# Patient Record
Sex: MALE | Race: WHITE | Employment: FULL TIME | URBAN - METROPOLITAN AREA
[De-identification: names, ages, dates, MRNs, and addresses within clinical notes are randomized per-mention and may not be internally consistent; named-entity substitution may affect disease eponyms.]

---

## 2018-01-08 ENCOUNTER — OFFICE VISIT (OUTPATIENT)
Dept: PSYCHIATRY | Facility: CLINIC | Age: 28
End: 2018-01-08
Payer: COMMERCIAL

## 2018-01-08 VITALS — BODY MASS INDEX: 23.76 KG/M2 | WEIGHT: 175.4 LBS | HEIGHT: 72 IN

## 2018-01-08 DIAGNOSIS — F12.91 HISTORY OF MARIJUANA USE: ICD-10-CM

## 2018-01-08 DIAGNOSIS — F34.1 DYSTHYMIA: Primary | ICD-10-CM

## 2018-01-08 DIAGNOSIS — F33.41 RECURRENT MAJOR DEPRESSIVE DISORDER, IN PARTIAL REMISSION (H): ICD-10-CM

## 2018-01-08 NOTE — MR AVS SNAPSHOT
After Visit Summary   1/8/2018    Tanner Acuña    MRN: [id number]           Patient Information     Date Of Birth          1990        Visit Information        Provider Department      1/8/2018 2:00 PM Lovely Mendes MD [facility] Psychiatry         Follow-ups after your visit        Your next 10 appointments already scheduled     Jan 22, 2018  9:00 AM CST   TMS EVAULATION with Lovely Mendes MD   [facility] Psychiatry ([facility])    [address]  [facility] [id number] [phone]              Who to contact     Please call your clinic at [phone] to:    Ask questions about your health    Make or cancel appointments    Discuss your medicines    Learn about your test results    Speak to your doctor   If you have compliments or concerns about an experience at your clinic, or if you wish to file a complaint, please contact [facility] Patient Relations at [phone] or email us at [email or web address]         Additional Information About Your Visit        MyChart Information     [employer] gives you secure access to your electronic health record. If you see a primary care provider, you can also send messages to your care team and make appointments. If you have questions, please call your primary care clinic.  If you do not have a primary care provider, please call [phone] and they will assist you.      [employer] is an electronic gateway that provides easy, online access to your medical records. With [employer], you can request a clinic appointment, read your test results, renew a prescription or communicate with your care team.     To access your existing account, please contact your [facility] or call [phone] for assistance.        Care EveryWhere ID     This is your Care EveryWhere ID. This could be used by other organizations to access your [place] medical records  [id number]        Your Vitals Were   "   Height BMI (Body Mass Index)                5' 11.5\" (181.6 cm) 24.12 kg/m2           Blood Pressure from Last 3 Encounters:   No data found for BP    Weight from Last 3 Encounters:   01/08/18 175 lb 6.4 oz (79.6 kg)              Today, you had the following     No orders found for display       Primary Care Provider Office Phone # Fax #    Durga Pinon [phone]       [address] [address]        Equal Access to Services     [facility] : Hadii aad ku hadasho Soomaali, waaxda luqadaha, qaybta kaalmada adeegyada, waxay idiin hayaan adesanford kharastefano hamlin . So [facility] [phone].    ATENCIÓN: Si habla español, tiene a chowdhury disposición servicios gratuitos de asistencia lingüística. [facility] [phone].    We comply with applicable federal civil rights laws and [place] laws. We do not discriminate on the basis of race, color, national origin, age, disability, sex, sexual orientation, or gender identity.            Thank you!     Thank you for choosing [facility]  for your care. Our goal is always to provide you with excellent care. Hearing back from our patients is one way we can continue to improve our services. Please take a few minutes to complete the written survey that you may receive in the mail after your visit with us. Thank you!             Your Updated Medication List - Protect others around you: Learn how to safely use, store and throw away your medicines at www.disposemymeds.org.          This list is accurate as of: 1/8/18  3:16 PM.  Always use your most recent med list.                   Brand Name Dispense Instructions for use Diagnosis    NALTREXONE HCL PO      Take 1.5 mg by mouth 2-3 tabs daily          "

## 2018-01-29 ENCOUNTER — OFFICE VISIT (OUTPATIENT)
Dept: PSYCHIATRY | Facility: CLINIC | Age: 28
End: 2018-01-29
Payer: COMMERCIAL

## 2018-01-29 VITALS
BODY MASS INDEX: 24.54 KG/M2 | HEART RATE: 57 BPM | HEIGHT: 70 IN | TEMPERATURE: 96.5 F | RESPIRATION RATE: 16 BRPM | WEIGHT: 171.4 LBS | SYSTOLIC BLOOD PRESSURE: 132 MMHG | DIASTOLIC BLOOD PRESSURE: 72 MMHG

## 2018-01-29 DIAGNOSIS — F33.41 RECURRENT MAJOR DEPRESSIVE DISORDER, IN PARTIAL REMISSION (H): ICD-10-CM

## 2018-01-29 DIAGNOSIS — F34.1 DYSTHYMIA: Primary | ICD-10-CM

## 2018-01-29 DIAGNOSIS — F12.91 HISTORY OF MARIJUANA USE: ICD-10-CM

## 2018-01-29 RX ORDER — ARIPIPRAZOLE 5 MG/1
5 TABLET ORAL AT BEDTIME
Qty: 15 TABLET | Refills: 1 | Status: SHIPPED | OUTPATIENT
Start: 2018-01-29 | End: 2018-06-25

## 2018-01-29 ASSESSMENT — PAIN SCALES - GENERAL: PAINLEVEL: NO PAIN (0)

## 2018-01-29 NOTE — PROGRESS NOTES
"  Psychiatry Clinic New Patient Medication Evaluation                                           Tanner Acuña is a 27 year old male    Therapist: {NONE/NA:564652::\"None\"}  PCP: Durga Pinon  Other Providers: {NONE/NA:122625::\"None\"}  Referred by *** for evaluation of {Psych Assess List:414399}.     History was provided by {PATIENT. FAMILY:377019} who was a {HIST:735354} historian.     Chief Complaint                                                                                                        \" *** \"     History of Present Illness                                                                                     4, 4      Pertinent Background:  {PB1:563050}.  Psych critical item history includes {CRIT:860676}.     Most recent history began ***.    Recent Symptoms:   {PSYROS3:199388}       Recent Substance Use:  {SUB:199476}      Substance Use History     {SUBHX:666092}     Psychiatric History     {PSYHX:400494}       Psychiatric Medication Trials     {PSYMEDS:383125}                                                       OR this and delete the other    Drug /  Start Date Dose (mg) Helpful Adverse Effects DC Reason / Date                           Social/ Family History               [per patient report]                                                      1ea, 1ea     {SOCIAL:199397}     Medical / Surgical History   There is no problem list on file for this patient.      No past surgical history on file.    Medical Review of Systems                                                                                                         2, 10     {MEDROS:199390}     Allergy   Review of patient's allergies indicates not on file.   Current Medications     Current Outpatient Prescriptions   Medication Sig Dispense Refill     NALTREXONE HCL PO Take 1.5 mg by mouth 2-3 tabs daily        Vitals                                                                                                                          " "   3, 3     There were no vitals taken for this visit.      Mental Status Exam                                                                                        9, 14 cog gs     Alertness: {ALERTNESS:332798}  Appearance: {APPEARANCE :279197}  Behavior/Demeanor: {BEHAVIOR :591178}, with {Desc; good/fair/poor:752097} eye contact   Speech: {SPEECH :466286}  Language: {LANGUAGE :648534}  Psychomotor: {PSYCHOMOTOR :595909}  Mood: {MOOD :808677}  Affect: {AFFECT :358757}; {was:202526} congruent to mood; {was:488403} congruent to content  Thought Process/Associations: {THOUGHT PROCESS:743507}  Thought Content:  Reports {THOUGHT CONTENT :678483};  Denies {THOUGHT CONTENT :209858}  Perception:  Reports {PERCEP:741812};  Denies {PERCEP:932192}  Insight: {INSIGHT :576412}  Judgment: {JUDGMENT :794103}  Cognition: (6) {co}  Gait and Station: {UNREMARKABLE refresh:030699}     Labs and Data     Rating Scales:  {scales:284123}    PHQ9 Today:  ***  No flowsheet data found.      No lab results found.  No lab results found.    Diagnosis and Assessment                                                                                  m2, h3     Today the following issues were addressed:    {PSYPROB:862402}    {PSYPMP:947639}    {PSYDRUGINT:581742}    Plan                                                                                                                          m2, h3     1) ***  {PSYTX:082686}    2) ***  {PSYTX:493016}    3) ***  {PSYTX:173896}    RTC: ***    CRISIS NUMBERS:   Provided routinely in AVS.    Treatment Risk Statement:  The patient understands the risks, benefits, adverse effects and alternatives. Agrees to treatment with the capacity to do so. No medical contraindications to treatment. Agrees to call clinic for any problems. The patient understands to call 911 or go to the nearest ED if life threatening or urgent symptoms occur.     WHODAS 2.0  TODAY total score = {NA:369896::\"N/A\"}; [a 12-item " WHODAS 2.0 assessment {was:607046} completed by the pt today and/or recorded in EPIC].     PROVIDER:  Lovely Mendes MD

## 2018-01-29 NOTE — PROGRESS NOTES
"  Psychiatry Clinic New Patient Medication Evaluation                                           Tanner Acuña is a 27 year old male    Therapist: None  PCP: Durga Pinon  Other Providers: None  Referred by self for evaluation of depression and attentional problems.     History was provided by patient who was a good historian.     Chief Complaint                                                                                                        \" Patient reports having difficulties with mood and anxiety for a number of years. \"I have not felt good for a long time but I am functional \"     History of Present Illness                                                                                     4, 4      Pertinent Background:  This patient first experienced depressed mood and obsessive 'heavy concious\" during his adolescents. He later experienced depressed mood and anxiety during college. 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 .  Psych critical item history includes mild depression and constant anxiety and ruminations and mutiple psychotropic trials. Patient is able to work and 'on the surface' does not give indications of distress but internally doesn't 'feel right'2    Most recent history began Back in October 2016, patient got off all meds. He has tried supplements, exercise, and other holistic approaches without major success. He relocated back to [place] and is seeking help. .    Recent Symptoms:   Depression:  anhedonia, low energy, poor concentration /memory and indecisiveness  Anxiety:  excessive worry and nervous/overwhelmed       Recent Substance Use:  none reported      Substance Use History     Past Use- He experimented with marijuana in college. But it was really that one event back at 24 when he had a dissociative paramoid experience with marijuana (presumably the second day " after having sex - countrary to his [employer] beliefs)     Psychiatric History     Suicidal Ideation Hx [passive, active]- during the paranoid experience. He saw a counselor at e time and later referred for some medications. He eventually stopped seeing healthcare providers because of financial reasons.        Psychiatric Medication Trials     Zoloft (sertraline), Wellbutrin, Zyban, Aplenzin (bupropion) and Cymbalta (duloxetine). Note that the original trial with ZOloft was helpful but improving his 'dissociative feelings' by 80%.  but then lost its effects. This is when he tried others .                                                         Social/ Family History               [per patient report]                                                      1ea, 1ea     FINANCIAL SUPPORT- working and but not currently insured for mental health care       EARLY HISTORY/ EDUCATION- Good education, completed college  SOCIAL/ SPIRITUAL SUPPORT- grew up in a Congregation environment, parents where strict christians, this has influenced his morality and in parts explains why he thought should not have sex before marrieage       CULTURAL INFLUENCES/ IMPACT- UNKNOWN       FAMILY HISTORY-  One of 5 siblings. Uncle and grandfather (paternal side) suffered from depression. Aunt (maternal) had BPAD, her daughter and granddaughter have schizophrenia     Medical / Surgical History     Patient Active Problem List   Diagnosis     Dysthymia     Recurrent major depressive disorder, in partial remission (H)     History of marijuana use       No past surgical history on file.    Medical Review of Systems                                                                                                         2, 10     PSYCH: See HPI  The remainder of the review of systems is noncontributory     Allergy   Review of patient's allergies indicates not on file.   Current Medications     Current Outpatient Prescriptions   Medication Sig Dispense  "Refill     NALTREXONE HCL PO Take 1.5 mg by mouth 2-3 tabs daily        Vitals                                                                                                                             3, 3     Ht 1.816 m (5' 11.5\")  Wt 79.6 kg (175 lb 6.4 oz)  BMI 24.12 kg/m2      Mental Status Exam                                                                                        9, 14 cog gs     Alertness: alert  and oriented  Appearance: well groomed  Behavior/Demeanor: cooperative and pleasant, with good  eye contact   Speech: normal  Language: no problems  Psychomotor: normal or unremarkable  Mood: depressed, anxious and worried  Affect: full range; was congruent to mood; was congruent to content  Thought Process/Associations: unremarkable  Thought Content:  Reports preoccupations;  Denies suicidal ideation  Perception:  Reports none;  Denies none  Insight: good  Judgment: good  Cognition: (6) oriented: time, person, and place  attention span: intact  concentration: intact  recent memory: intact  remote memory: intact  fund of knowledge: appropriate  Gait and Station: unremarkable     Labs and Data     Rating Scales:  PHQ9    PHQ9 Today:  13  No flowsheet data found.      No lab results found.  No lab results found.    Diagnosis and Assessment                                                                                  m2, h3     Today the following issues were addressed:    1) feeling dissociated \"not quite oneself'  2) mild depression  3) anxiety and ruminations    [place] Prescription Monitoring Program [] review was not needed today.        Plan                                                                                                                          m2, h3     1) have patient come back to complete additional assessments and finalize diagnosis  None today      RTC: *2weeks    CRISIS NUMBERS:   Provided routinely in AVS.    Treatment Risk Statement:  The patient understands the risks, " benefits, adverse effects and alternatives. Agrees to treatment with the capacity to do so. No medical contraindications to treatment. Agrees to call clinic for any problems. The patient understands to call 911 or go to the nearest ED if life threatening or urgent symptoms occur.     WHODAS 2.0  TODAY total score = N/A; [a 12-item WHODAS 2.0 assessment was not completed by the pt today and/or recorded in EPIC].     PROVIDER:  Lovely Mendes MD

## 2018-01-29 NOTE — MR AVS SNAPSHOT
After Visit Summary   1/29/2018    Tanner Acuña    MRN: [id number]           Patient Information     Date Of Birth          1990        Visit Information        Provider Department      1/29/2018 9:00 AM Lovely Mendes MD [facility] Psychiat[facility]        Today's Diagnoses     Dysthymia    -  1    Recurrent major depressive disorder, in partial remission (H)        History of marijuana use          Care Instructions    Call within a week to update if tolerated med. Can certainly call earlier if needed.          Follow-ups after your visit        Follow-up notes from your care team     Return in about 4 weeks (around 2/26/2018) for Med Follow up.      Your next 10 appointments already scheduled     Feb 26, 2018  9:00 AM CST   Return Visit with Lovely Mendes MD   [facility] ([facility])    [address] [address] [phone]              Who to contact     Please call your clinic at [phone] to:    Ask questions about your health    Make or cancel appointments    Discuss your medicines    Learn about your test results    Speak to your doctor            Additional Information About Your Visit        [employer] Information     [employer] gives you secure access to your electronic health record. If you see a primary care provider, you can also send messages to your care team and make appointments. If you have questions, please call your primary care clinic.  If you do not have a primary care provider, please call [phone] and they will assist you.      [employer] is an electronic gateway that provides easy, online access to your medical records. With [employer], you can request a clinic appointment, read your test results, renew a prescription or communicate with your care team.     To access your existing account, please contact your [facility] or call [phone] for assistance.        Care EveryWhere ID     This is your Care  "EveryWhere ID. This could be used by other organizations to access your [place] medical records  [id number]        Your Vitals Were     Pulse Temperature Respirations Height BMI (Body Mass Index)       57 96.5  F (35.8  C) 16 1.79 m (5' 10.47\") 24.27 kg/m2        Blood Pressure from Last 3 Encounters:   01/29/18 132/72    Weight from Last 3 Encounters:   01/29/18 77.7 kg (171 lb 6.4 oz)   01/08/18 79.6 kg (175 lb 6.4 oz)              Today, you had the following     No orders found for display         Today's Medication Changes          These changes are accurate as of 1/29/18 11:59 PM.  If you have any questions, ask your nurse or doctor.               Start taking these medicines.        Dose/Directions    ARIPiprazole 5 MG tablet   Commonly known as:  ABILIFY   Used for:  Dysthymia, Recurrent major depressive disorder, in partial remission (H)   Started by:  Lovely Mendes MD        Dose:  5 mg   Take 1 tablet (5 mg) by mouth At Bedtime Start with half tablet at bedtime for 5 days then increase to 1 full tablet of 5 mg at bedtime.   Quantity:  15 tablet   Refills:  1            Where to get your medicines      Some of these will need a paper prescription and others can be bought over the counter.  Ask your nurse if you have questions.     Bring a paper prescription for each of these medications     ARIPiprazole 5 MG tablet                Primary Care Provider Office Phone # Fax #    Durga Pinon [phone]       [place] [address]        Equal Access to Services     [facility] AH: Hadii damien rojaso Soargentina, waaxda luqadaha, qaybta kaalmada adeegyada, waxclaudia dominick wolf. So [facility] [phone].    ATENCIÓN: Si habla español, tiene a chowdhury disposición servicios gratuitos de asistencia lingüística. Llame al [phone].    We comply with applicable federal civil rights laws and [place] laws. We do not discriminate on the basis of race, " color, national origin, age, disability, sex, sexual orientation, or gender identity.            Thank you!     Thank you for choosing [facility]  for your care. Our goal is always to provide you with excellent care. Hearing back from our patients is one way we can continue to improve our services. Please take a few minutes to complete the written survey that you may receive in the mail after your visit with us. Thank you!             Your Updated Medication List - Protect others around you: Learn how to safely use, store and throw away your medicines at www.disposemymeds.org.          This list is accurate as of 1/29/18 11:59 PM.  Always use your most recent med list.                   Brand Name Dispense Instructions for use Diagnosis    ARIPiprazole 5 MG tablet    ABILIFY    15 tablet    Take 1 tablet (5 mg) by mouth At Bedtime Start with half tablet at bedtime for 5 days then increase to 1 full tablet of 5 mg at bedtime.    Dysthymia, Recurrent major depressive disorder, in partial remission (H)       NALTREXONE HCL PO      Take 1.5 mg by mouth 2-3 tabs daily

## 2018-01-30 ASSESSMENT — PATIENT HEALTH QUESTIONNAIRE - PHQ9: SUM OF ALL RESPONSES TO PHQ QUESTIONS 1-9: 13

## 2018-02-09 NOTE — PROGRESS NOTES
Psychiatry Clinic Progress Note                                                                   Tanner Acuña is a 27 year old male who prefers the name Tanner and pronoun anibal.  Therapist: None  PCP: Durga Pinon  Other Providers: None    Pertinent Background:  See previous notes.  Psych critical item history includes [no critical items].     Interim History                                                                                                 4, 4     The patient is a good historian.  Since the last visit , he had reviewed the diagnostic criteria for [facility] (as I suggested it). He did not believe that this diagnosis really fit him despite the fact that he has 'high morals', 'wants to do things right' and often ruminates about self and his putative role in any argument or distressing situation. .    Recent Symptoms:   Depression:  Mood is slightly improved since switching jobs (tasks)  Anxiety:  excessive worry     Recent Substance Use:  none reported        Social/ Family History                                  [per patient report]                             1ea,1ea   FINANCIAL SUPPORT- working         Medical / Surgical History                                                                                                                  Patient Active Problem List   Diagnosis     Dysthymia     Recurrent major depressive disorder, in partial remission (H)     History of marijuana use       Past Surgical History:   Procedure Laterality Date     HEAD & NECK SURGERY      lump removed from neck as a child      Medical Review of Systems                                                                                          2,10   PSYCH: See HPI  Allergy                                Mold  Current Medications                                                                                                       Current Outpatient Prescriptions   Medication Sig Dispense Refill     ARIPiprazole (ABILIFY) 5  "MG tablet Take 1 tablet (5 mg) by mouth At Bedtime Start with half tablet at bedtime for 5 days then increase to 1 full tablet of 5 mg at bedtime. 15 tablet 1     NALTREXONE HCL PO Take 1.5 mg by mouth 2-3 tabs daily       Vitals                                                                                                                3, 3   /72 (BP Location: Right arm, Patient Position: Chair, Cuff Size: Adult Regular)  Pulse 57  Temp 96.5  F (35.8  C)  Resp 16  Ht 1.79 m (5' 10.47\")  Wt 77.7 kg (171 lb 6.4 oz)  BMI 24.27 kg/m2   Mental Status Exam                                                                                        9, 14 cog gs     Alertness: alert   Appearance: well groomed  Behavior/Demeanor: cooperative, with good  eye contact   Speech: normal  Language: intact  Psychomotor: normal or unremarkable  Mood: depressed  Affect: full range; was congruent to mood; was congruent to content  Thought Process/Associations: unremarkable  Thought Content:  Reports preoccupations;  Denies suicidal ideation  Perception:  Reports derealization;  Denies none  Insight: good  Judgment: good  Cognition: (6) does  appear grossly intact; formal cognitive testing was not done  Gait/Station and/or Muscle Strength/Tone: unremarkable    Labs and Data                                                                                                                 Rating Scales:  PHQ9    PHQ9 Today:  13  PHQ-9 SCORE 1/29/2018   Total Score 13         Diagnosis and Assessment                                                                                  m2, h3     Today the following issues were addressed:    1) dysthymia  2) financial assistance to seek mental health care    [place] Prescription Monitoring Program [] review was not needed today.    PSYCHOTROPIC DRUG INTERACTIONS: none clinically relevant    Plan                                                                                                  "                        m2, h3      1) Start Abilify (evidence for atypical antipsychotics for dysthymia). Explained to patient evidence based for product not available in the [place]. Chose abilify as an alternative.; Consulted with pharmacist so that he would get the lowest cost medications.   Medication-    2) depression / anxiety  Therapy- currently doesn't have the financial resources to start. we will consider it if minimal response to meds          RTC: in 1 month but instructed to call back with questions and update on tolerance of medication    CRISIS NUMBERS:   Provided routinely in AVS.    Treatment Risk Statement:  The patient understands the risks, benefits, adverse effects and alternatives. Agrees to treatment with the capacity to do so. No medical contraindications to treatment. Agrees to call clinic for any problems. The patient understands to call 911 or go to the nearest ED if life threatening or urgent symptoms occur.      TREATMENT  PLAN          SYMPTOMS; PROBLEMS   MEASURABLE GOALS;    FUNCTIONAL IMPROVEMENT INTERVENTIONS;   GAINS MADE DISCHARGE CRITERIA   Depression: depressed mood, anhedonia and low energy  Anxiety: excessive worry and nervous/overwhelmed   reduce depressive symptoms acceptance of limitations/reality  building on strengths  communication skills symptom resolution   Substance Use: abstinence   dissociation / suspiciousness medications  symptom resolution     PROVIDER:  Lovely Mendes MD

## 2018-03-05 ENCOUNTER — OFFICE VISIT (OUTPATIENT)
Dept: PSYCHIATRY | Facility: CLINIC | Age: 28
End: 2018-03-05
Payer: COMMERCIAL

## 2018-03-05 VITALS
TEMPERATURE: 98.2 F | DIASTOLIC BLOOD PRESSURE: 66 MMHG | WEIGHT: 176.4 LBS | HEART RATE: 67 BPM | BODY MASS INDEX: 25.25 KG/M2 | HEIGHT: 70 IN | SYSTOLIC BLOOD PRESSURE: 124 MMHG

## 2018-03-05 DIAGNOSIS — F39 MOOD DISORDER (H): Primary | ICD-10-CM

## 2018-03-05 RX ORDER — LAMOTRIGINE 25 MG/1
25 TABLET ORAL DAILY
Qty: 15 TABLET | Refills: 1 | Status: SHIPPED | OUTPATIENT
Start: 2018-03-05 | End: 2018-06-25

## 2018-03-05 ASSESSMENT — PAIN SCALES - GENERAL: PAINLEVEL: NO PAIN (0)

## 2018-03-05 NOTE — PATIENT INSTRUCTIONS
Start with lamotrigine 25 mg per day for 2 weeks  Informed about risk of Marion Abhi. Stop meds if rash and contact provider  Once insurance is active, start psychotherapy with a focus on mindfulness  Call back on Thursday to set up an appointment

## 2018-03-05 NOTE — PROGRESS NOTES
Psychiatry Clinic Progress Note                                                                   Tanner Acuña is a 27 year old male who prefers the name Tanner  Therapist: None  PCP: Durga Pinon  Other Providers: None    Pertinent Background:  See previous notes.  Psych critical item history includes SUBSTANCE USE: cannabis. In addition to dysthymia and dissociations     Interim History                                                                                                        4, 4     The patient reports adequate treatment adherence for 1 week.  Since the last visit he tried abilify 2.5 mg x 3 days then 5 mg for 2 days. He experienced feeling like a robot, flat and unemotional. He disliked the experience and stopped it. The past week, having changed jobs, into more creative and interactive tasks, he feels 25 % better. He amicably broke off with his girlfriend last Sunday (Long distance relationship - she living in [place])    Recent Symptoms:   Depression:  mild depression, and chronic sense of mild dissociation when not engaged in focused mental activities like an important conversation     Recent Substance Use:  none reported        Social/ Family History                                  [per patient report]                                 1ea,1ea   seeking mental health insurance coverage    Medical / Surgical History                                                                                                                  Patient Active Problem List   Diagnosis     Dysthymia     Recurrent major depressive disorder, in partial remission (H)     History of marijuana use       Past Surgical History:   Procedure Laterality Date     HEAD & NECK SURGERY      lump removed from neck as a child        Medical Review of Systems                                                                                                    2,10   not performed outside of the [facility]  Allergy                              "   Mold  Current Medications                                                                                                       Current Outpatient Prescriptions   Medication Sig Dispense Refill     Fexofenadine HCl (ALLEGRA PO) Take by mouth daily       lamoTRIgine (LAMICTAL) 25 MG tablet Take 1 tablet (25 mg) by mouth daily for 15 days 15 tablet 1     ARIPiprazole (ABILIFY) 5 MG tablet Take 1 tablet (5 mg) by mouth At Bedtime Start with half tablet at bedtime for 5 days then increase to 1 full tablet of 5 mg at bedtime. 15 tablet 1     NALTREXONE HCL PO Take 1.5 mg by mouth 2-3 tabs daily       Vitals                                                                                                                       3, 3   /66 (BP Location: Right arm, Patient Position: Chair, Cuff Size: Adult Regular)  Pulse 67  Temp 98.2  F (36.8  C) (Temporal)  Ht 1.79 m (5' 10.47\")  Wt 80 kg (176 lb 6.4 oz)  BMI 24.97 kg/m2   Mental Status Exam                                                                                    9, 14 cog gs     Alertness: alert  and oriented  Appearance: well groomed  Behavior/Demeanor: cooperative, pleasant and calm, with good  eye contact   Speech: normal  Language: intact  Psychomotor: normal or unremarkable  Mood: depressed  Affect: restricted; was congruent to mood; was congruent to content  Thought Process/Associations: unremarkable  Thought Content:  Reports preoccupations;  Denies none  Perception:  Reports none;  Denies none  Insight: good  Judgment: good  Cognition: (6) does  appear grossly intact; formal cognitive testing was not done  Gait/Station and/or Muscle Strength/Tone: unremarkable    Labs and Data                                                                                                                 Rating Scales:    N/A    PHQ9 Today:  Not done  PHQ-9 SCORE 1/29/2018   Total Score 13         Diagnosis and Assessment                                        "                                      m2, h3     Today the following issues were addressed:    1) mood    [place] Prescription Monitoring [place] [] was checked today:  indicates subutex.    PSYCHOTROPIC DRUG INTERACTIONS: None.    Plan                                                                                                                    m2, h3      1) dysthymia   Medication-   Did not tolerate abilify, will switch to lamotrigine as only mood stabilizer with antidepressant actions    2) dissociation   Medication-\  Start lamotrigine at 25 mg (slow taper )  Once with insurance coverage, refer to psychotherapy with a focus on mindfulness        RTC: he will contact clinic next Thursday    CRISIS NUMBERS:   Provided routinely in AVS.    Treatment Risk Statement:  The patient understands the risks, benefits, adverse effects and alternatives. Agrees to treatment with the capacity to do so. No medical contraindications to treatment. Agrees to call clinic for any problems. The patient understands to call 911 or go to the nearest ED if life threatening or urgent symptoms occur.      Psychiatry Clinic Individual Psychotherapy Note                                                                     [16]     Start time - 9 am        End time - 9:30 am  Date reviewed - N/A       Date next due - N/A     Subjective: This supportive psychotherapy session addressed issues related to focused on starting mindfulness and working around his engagement in the moment.  Patient's reaction: Pre-contemplation in the context of mental status appropriate for ambulatory setting.  Progress: just started  Plan: RTC when insurance cleared  Psychotherapy services during this visit included myself and the patient.   Treatment Plan      SYMPTOMS; PROBLEMS   MEASURABLE GOALS;    FUNCTIONAL IMPROVEMENT INTERVENTIONS;   GAINS MADE DISCHARGE CRITERIA   Depression: depressed mood, anhedonia and concentration problems  Anxiety: excessive worry  and ruminations   Medications:  optimize medications   reduce depressive symptoms acceptance of limitations/reality marked symptom improvement             PROVIDER:  Lovely Mendes MD

## 2018-03-05 NOTE — MR AVS SNAPSHOT
"              After Visit Summary   3/5/2018    Tanner Acuña    MRN: [id number]           Patient Information     Date Of Birth          1990        Visit Information        Provider Department      3/5/2018 9:00 AM Lovely Mendes MD [facility] Psychiatry        Today's Diagnoses     Mood disorder (H)    -  1      Care Instructions    Start with lamotrigine 25 mg per day for 2 weeks  Informed about risk of Marion Abhi. Stop meds if rash and contact provider  Once insurance is active, start psychotherapy with a focus on mindfulness  Call back on Thursday to set up an appointment           Follow-ups after your visit        Who to contact     Please call your clinic at [phone] to:    Ask questions about your health    Make or cancel appointments    Discuss your medicines    Learn about your test results    Speak to your doctor            Additional Information About Your Visit        [employer] Information     [employer] gives you secure access to your electronic health record. If you see a primary care provider, you can also send messages to your care team and make appointments. If you have questions, please call your primary care clinic.  If you do not have a primary care provider, please call [phone] and they will assist you.      [employer] is an electronic gateway that provides easy, online access to your medical records. With [employer], you can request a clinic appointment, read your test results, renew a prescription or communicate with your care team.     To access your existing account, please contact your [facility] or call [phone] for assistance.        Care EveryWhere ID     This is your Care EveryWhere ID. This could be used by other organizations to access your [place] medical records  [id number]        Your Vitals Were     Pulse Temperature Height BMI (Body Mass Index)          67 98.2  F (36.8  C) (Temporal) 1.79 m (5' 10.47\") 24.97 kg/m2         Blood Pressure " from Last 3 Encounters:   03/05/18 124/66   01/29/18 132/72    Weight from Last 3 Encounters:   03/05/18 80 kg (176 lb 6.4 oz)   01/29/18 77.7 kg (171 lb 6.4 oz)   01/08/18 79.6 kg (175 lb 6.4 oz)              Today, you had the following     No orders found for display         Today's Medication Changes          These changes are accurate as of 3/5/18  9:55 AM.  If you have any questions, ask your nurse or doctor.               Start taking these medicines.        Dose/Directions    lamoTRIgine 25 MG tablet   Commonly known as:  LAMICTAL   Used for:  Mood disorder (H)   Started by:  Lovely Mendes MD        Dose:  25 mg   Take 1 tablet (25 mg) by mouth daily for 15 days   Quantity:  15 tablet   Refills:  1            Where to get your medicines      Some of these will need a paper prescription and others can be bought over the counter.  Ask your nurse if you have questions.     Bring a paper prescription for each of these medications     lamoTRIgine 25 MG tablet                Primary Care Provider Office Phone # Fax #    Durga Pinon [phone]       [place] [id number]        Equal Access to Services     BHASKAR GAVIRIA AH: Hadii damien geiger Soargentina, walesleeda luqcelia, qaybta kaalmada adeannmarieda, francis wolf. So [facility] [phone].    ATENCIÓN: Si habla español, tiene a chowdhury disposición servicios gratuitos de asistencia lingüística. Llame al [phone].    We comply with applicable federal civil rights laws and [place] laws. We do not discriminate on the basis of race, color, national origin, age, disability, sex, sexual orientation, or gender identity.            Thank you!     Thank you for choosing [facility]  for your care. Our goal is always to provide you with excellent care. Hearing back from our patients is one way we can continue to improve our services. Please take a few minutes to complete the written survey that you  may receive in the mail after your visit with us. Thank you!             Your Updated Medication List - Protect others around you: Learn how to safely use, store and throw away your medicines at www.disposemymeds.org.          This list is accurate as of 3/5/18  9:55 AM.  Always use your most recent med list.                   Brand Name Dispense Instructions for use Diagnosis    ALLEGRA PO      Take by mouth daily        ARIPiprazole 5 MG tablet    ABILIFY    15 tablet    Take 1 tablet (5 mg) by mouth At Bedtime Start with half tablet at bedtime for 5 days then increase to 1 full tablet of 5 mg at bedtime.    Dysthymia, Recurrent major depressive disorder, in partial remission (H)       lamoTRIgine 25 MG tablet    LAMICTAL    15 tablet    Take 1 tablet (25 mg) by mouth daily for 15 days    Mood disorder (H)       NALTREXONE HCL PO      Take 1.5 mg by mouth 2-3 tabs daily

## 2018-03-14 ENCOUNTER — TRANSFERRED RECORDS (OUTPATIENT)
Dept: HEALTH INFORMATION MANAGEMENT | Facility: CLINIC | Age: 28
End: 2018-03-14

## 2018-06-25 ENCOUNTER — OFFICE VISIT (OUTPATIENT)
Dept: PSYCHIATRY | Facility: CLINIC | Age: 28
End: 2018-06-25
Payer: COMMERCIAL

## 2018-06-25 VITALS
TEMPERATURE: 98.1 F | WEIGHT: 172 LBS | SYSTOLIC BLOOD PRESSURE: 109 MMHG | BODY MASS INDEX: 24.35 KG/M2 | DIASTOLIC BLOOD PRESSURE: 61 MMHG | HEART RATE: 58 BPM

## 2018-06-25 DIAGNOSIS — F90.9 ATTENTION DEFICIT HYPERACTIVITY DISORDER (ADHD), UNSPECIFIED ADHD TYPE: ICD-10-CM

## 2018-06-25 DIAGNOSIS — F39 MOOD DISORDER (H): ICD-10-CM

## 2018-06-25 DIAGNOSIS — F41.1 GAD (GENERALIZED ANXIETY DISORDER): Primary | ICD-10-CM

## 2018-06-25 RX ORDER — NALTREXONE HYDROCHLORIDE 50 MG/1
50 TABLET, FILM COATED ORAL DAILY
Qty: 30 TABLET | Refills: 1 | Status: SHIPPED | OUTPATIENT
Start: 2018-06-25

## 2018-06-25 RX ORDER — MULTIPLE VITAMINS W/ MINERALS TAB 9MG-400MCG
1 TAB ORAL DAILY
COMMUNITY

## 2018-06-25 RX ORDER — LAMOTRIGINE 25 MG/1
25 TABLET ORAL 2 TIMES DAILY
Qty: 60 TABLET | Refills: 0 | Status: SHIPPED | OUTPATIENT
Start: 2018-06-25 | End: 2018-07-30 | Stop reason: DRUGHIGH

## 2018-06-25 ASSESSMENT — PAIN SCALES - GENERAL: PAINLEVEL: NO PAIN (0)

## 2018-06-25 NOTE — MR AVS SNAPSHOT
After Visit Summary   6/25/2018    Tanner Acuña    MRN: [id number]           Patient Information     Date Of Birth          1990        Visit Information        Provider Department      6/25/2018 9:00 AM Lovely Mendes MD [facility] Psychiatry        Today's Diagnoses     ISRA (generalized anxiety disorder)    -  1    Mood disorder (H)        Attention deficit hyperactivity disorder (ADHD), unspecified ADHD type          Care Instructions    Please call to give update in 3 weeks  Seek mindfulness therapy for dissociative symptoms  Start lamictal and increase in 2 weeks. The goal is t ohave you on 100 mg per day for 1 month before consdering other alternative like provigil or ritalin for inattentiveness / attention deficit disorder          Follow-ups after your visit        Follow-up notes from your care team     Return in about 5 weeks (around 7/30/2018).      Your next 10 appointments already scheduled     Jul 30, 2018  9:00 AM CDT   Return Visit with Lovely Mendes MD   [facility]iat[facility] ([facility])    [address] [address] [phone]              Who to contact     Please call your clinic at [phone] to:    Ask questions about your health    Make or cancel appointments    Discuss your medicines    Learn about your test results    Speak to your doctor            Additional Information About Your Visit        [employer] Information     [employer] gives you secure access to your electronic health record. If you see a primary care provider, you can also send messages to your care team and make appointments. If you have questions, please call your primary care clinic.  If you do not have a primary care provider, please call [phone] and they will assist you.      [employer] is an electronic gateway that provides easy, online access to your medical records. With [employer], you can request a clinic appointment, read your test results, renew a prescription or  communicate with your care team.     To access your existing account, please contact your [facility] or call [phone] for assistance.        Care EveryWhere ID     This is your Care EveryWhere ID. This could be used by other organizations to access your [place] medical records  [id number]        Your Vitals Were     Pulse Temperature BMI (Body Mass Index)             58 98.1  F (36.7  C) 24.35 kg/m2          Blood Pressure from Last 3 Encounters:   06/25/18 109/61   03/05/18 124/66   01/29/18 132/72    Weight from Last 3 Encounters:   06/25/18 172 lb (78 kg)   03/05/18 176 lb 6.4 oz (80 kg)   01/29/18 171 lb 6.4 oz (77.7 kg)              Today, you had the following     No orders found for display         Today's Medication Changes          These changes are accurate as of 6/25/18 10:02 AM.  If you have any questions, ask your nurse or doctor.               These medicines have changed or have updated prescriptions.        Dose/Directions    lamoTRIgine 25 MG tablet   Commonly known as:  LaMICtal   This may have changed:  when to take this   Used for:  Mood disorder (H)   Changed by:  Lovely Mendes MD        Dose:  25 mg   Take 1 tablet (25 mg) by mouth 2 times daily   Quantity:  60 tablet   Refills:  0       naltrexone 50 MG tablet   Commonly known as:  DEPADE;REVIA   This may have changed:    - how much to take  - when to take this   Used for:  Mood disorder (H)   Changed by:  Lovely Mendes MD        Dose:  50 mg   Take 1 tablet (50 mg) by mouth daily 2-3 tabs daily   Quantity:  30 tablet   Refills:  1            Where to get your medicines      These medications were sent to [id number] - [place], [place] - [address]  [address] [address]     Phone:  [phone]     lamoTRIgine 25 MG tablet         These medications were sent to [facility] [address] [place], [place] - [address] AT [address] [place] [address] & [address]  [place] [address]    Hours:  24-hours Phone:  [phone]     naltrexone 50 MG tablet                Primary Care Provider Office Phone # Fax #    Durga Pinon [phone]       [facility] [address] [facility] [address]        Equal Access to Services     ARMANDO GAVIRIA : Hadii aad ku hadasho Soomaali, waaxda luqadaha, qaybta kaalmada adeegyada, waxay idiin hayaan adeeg kharash la'aan . So [facility] [phone].    ATENCIÓN: Si habla español, tiene a chowdhury disposición servicios gratuitos de asistencia lingüística. [facility] [phone].    We comply with applicable federal civil rights laws and [place] laws. We do not discriminate on the basis of race, color, national origin, age, disability, sex, sexual orientation, or gender identity.            Thank you!     Thank you for choosing [facility]  for your care. Our goal is always to provide you with excellent care. Hearing back from our patients is one way we can continue to improve our services. Please take a few minutes to complete the written survey that you may receive in the mail after your visit with us. Thank you!             Your Updated Medication List - Protect others around you: Learn how to safely use, store and throw away your medicines at www.disposemymeds.org.          This list is accurate as of 6/25/18 10:02 AM.  Always use your most recent med list.                   Brand Name Dispense Instructions for use Diagnosis    ALLEGRA PO      Take by mouth daily        lamoTRIgine 25 MG tablet    LaMICtal    60 tablet    Take 1 tablet (25 mg) by mouth 2 times daily    Mood disorder (H)       Multi-vitamin Tabs tablet      Take 1 tablet by mouth daily        naltrexone 50 MG tablet    DEPADE;REVIA    30 tablet    Take 1 tablet (50 mg) by mouth daily 2-3 tabs daily    Mood disorder (H)

## 2018-06-25 NOTE — PATIENT INSTRUCTIONS
Please call to give update in 3 weeks  Seek mindfulness therapy for dissociative symptoms  Start lamictal and increase in 2 weeks. The goal is t ohave you on 100 mg per day for 1 month before consdering other alternative like provigil or ritalin for inattentiveness / attention deficit disorder

## 2018-06-25 NOTE — PROGRESS NOTES
Psychiatry Clinic Progress Note                                                                   Tanner Acuña is a 28 year old male  Therapist: None  PCP: Durga Pinon  Other Providers: None    Pertinent Background:  See previous notes.  Psych critical item history includes [see section specific documentation].     Interim History                                                                                                        4, 4     The patient is a fair historian.  Since the last visit in March. Patient tried lamictal 25 mg for 2 weeks and felt no difference so stopped it. Has not been taking low dose naltrexone for joint pain because of the cost of seeing a provider. Now has new insurance. .    Recent Symptoms:   Continue complaining of mild dissociation where he either space out of thinks of a million things.      Recent Substance Use:  none reported        Social/ Family History                                  [per patient report]                                 1ea,1ea   Doing well at work and gotten a raise, continue to have feelings of disconnectedness despite hanging out with many.     Medical / Surgical History                                                                                                                  Patient Active Problem List   Diagnosis     Dysthymia     Recurrent major depressive disorder, in partial remission (H)     History of marijuana use       Past Surgical History:   Procedure Laterality Date     HEAD & NECK SURGERY      lump removed from neck as a child        Medical Review of Systems                                                                                                    2,10   A comprehensive review of systems was performed and is negative other than noted in the HPI.  Allergy                                Mold  Current Medications                                                                                                       Current  Outpatient Prescriptions   Medication Sig Dispense Refill     multivitamin, therapeutic with minerals (MULTI-VITAMIN) TABS tablet Take 1 tablet by mouth daily       ARIPiprazole (ABILIFY) 5 MG tablet Take 1 tablet (5 mg) by mouth At Bedtime Start with half tablet at bedtime for 5 days then increase to 1 full tablet of 5 mg at bedtime. (Patient not taking: Reported on 6/25/2018) 15 tablet 1     Fexofenadine HCl (ALLEGRA PO) Take by mouth daily       lamoTRIgine (LAMICTAL) 25 MG tablet Take 1 tablet (25 mg) by mouth daily for 15 days 15 tablet 1     NALTREXONE HCL PO Take 1.5 mg by mouth 2-3 tabs daily       Vitals                                                                                                                       3, 3   /61 (BP Location: Right arm, Patient Position: Chair, Cuff Size: Adult Regular)  Pulse 58  Temp 98.1  F (36.7  C)  Wt 78 kg (172 lb)  BMI 24.35 kg/m2   Mental Status Exam                                                                                    9, 14 cog gs     Alertness: alert  and oriented  Appearance: casually groomed  Behavior/Demeanor: cooperative, with good  eye contact   Speech: normal  Language: intact  Psychomotor: normal or unremarkable  Mood: anxious  Affect: full range; was congruent to mood; was congruent to content  Thought Process/Associations: unremarkable  Thought Content:  Reports reports constant mild dissociation;  Denies none  Perception:  Reports derealization;  Denies derealization  Insight: good  Judgment: good  Cognition: (6) does  appear grossly intact; formal cognitive testing was not done  Gait/Station and/or Muscle Strength/Tone: unremarkable    Labs and Data                                                                                                                 Rating Scales:    PHQ9    PHQ9 Today:  9  PHQ-9 SCORE 1/29/2018   Total Score 13         Diagnosis and Assessment                                                                              m2, h3     Today the following issues were addressed:    1) dissociation    [place] Prescription Monitoring Program [] was checked today:  not using controlled substances.    PSYCHOTROPIC DRUG INTERACTIONS: None.    Plan                                                                                                                    m2, h3      1) dissociation. Possible [employer] defense versus chronic anxiety versus attention deficit  Reintroduce lamictal at 25 mg for 2 weeks, then taper up to 100 mg. Patient to call with update in 3 weeks.  Patient to start mindfullness therapy. Will seek therapist and run name by me.  Will also restart low dose naltrexone        RTC:5 weeks    CRISIS NUMBERS:   Provided routinely in AVS.    Treatment Risk Statement:  The patient understands the risks, benefits, adverse effects and alternatives. Agrees to treatment with the capacity to do so. No medical contraindications to treatment. Agrees to call clinic for any problems. The patient understands to call 911 or go to the nearest ED if life threatening or urgent symptoms occur.      Treatment Plan      SYMPTOMS; PROBLEMS   MEASURABLE GOALS;    FUNCTIONAL IMPROVEMENT INTERVENTIONS;   GAINS MADE DISCHARGE CRITERIA   Anxiety: dissociative   make a plan to manage 2-3 anxiety-provoking situations acceptance of limitations/reality marked symptom improvement   ADHD: difficulty paying attention    reduce feeling overwhelmed/ improve decision making skills acceptance of limitations/reality marked symptom improvement     PROVIDER:  Lovely Mendes MD

## 2018-06-26 ASSESSMENT — PATIENT HEALTH QUESTIONNAIRE - PHQ9: SUM OF ALL RESPONSES TO PHQ QUESTIONS 1-9: 9

## 2018-07-24 ENCOUNTER — TELEPHONE (OUTPATIENT)
Dept: PSYCHIATRY | Facility: CLINIC | Age: 28
End: 2018-07-24

## 2018-07-24 NOTE — TELEPHONE ENCOUNTER
-Writer called and spoke with Tanner, he reported that when he went up to 100 mg he started feeling some great anxiety.  Patient reported that he is unsure if the medication caused it, but wanted to be safe and went back to 50 mg.  Encouraged him to try 75 mg.  Tanner reports he will try this.  Also encouraged him to call back if he has any issues with 75 mg.

## 2018-07-24 NOTE — TELEPHONE ENCOUNTER
"----- Message from Lovely Mendes MD sent at 7/23/2018  2:39 PM CDT -----  Regarding: RE: Med update  Thank you Abbi Hudson, could you please call him and encourage him to go up to 75 mg     Z  ----- Message -----     From: Tristan Chandra CMA     Sent: 7/23/2018  10:10 AM       To: Abbi De Santiago RN, Lovely Mendes MD  Subject: Med update                                       Hi,    You recently put him on lamotrigine with a titration schedule. Patient felt fine until he got to 100mg. Pt felt \"ill\" at that dose with some irritability and \"hard heart beats.\" He dropped his medication back down to 50mg. He wanted to give you this update. Pt is also scheduled to see you in clinic next week 7/30.    -Tristan    "

## 2018-07-30 ENCOUNTER — OFFICE VISIT (OUTPATIENT)
Dept: PSYCHIATRY | Facility: CLINIC | Age: 28
End: 2018-07-30
Payer: COMMERCIAL

## 2018-07-30 VITALS
TEMPERATURE: 98.3 F | BODY MASS INDEX: 24.08 KG/M2 | WEIGHT: 168.2 LBS | SYSTOLIC BLOOD PRESSURE: 111 MMHG | HEIGHT: 70 IN | HEART RATE: 67 BPM | DIASTOLIC BLOOD PRESSURE: 64 MMHG

## 2018-07-30 DIAGNOSIS — F51.01 PRIMARY INSOMNIA: ICD-10-CM

## 2018-07-30 DIAGNOSIS — F41.1 GAD (GENERALIZED ANXIETY DISORDER): Primary | ICD-10-CM

## 2018-07-30 RX ORDER — TRAZODONE HYDROCHLORIDE 50 MG/1
50 TABLET, FILM COATED ORAL
Qty: 90 TABLET | Refills: 3 | Status: SHIPPED | OUTPATIENT
Start: 2018-07-30 | End: 2020-08-17

## 2018-07-30 RX ORDER — LAMOTRIGINE 100 MG/1
100 TABLET ORAL DAILY
Qty: 60 TABLET | Refills: 1 | Status: SHIPPED | OUTPATIENT
Start: 2018-07-30 | End: 2018-10-17

## 2018-07-30 RX ORDER — SERTRALINE HYDROCHLORIDE 100 MG/1
50 TABLET, FILM COATED ORAL DAILY
Qty: 30 TABLET | Refills: 1 | Status: SHIPPED | OUTPATIENT
Start: 2018-07-30 | End: 2019-01-28

## 2018-07-30 ASSESSMENT — PAIN SCALES - GENERAL: PAINLEVEL: NO PAIN (0)

## 2018-07-30 NOTE — PROGRESS NOTES
"  Psychiatry Clinic Progress Note                                                                   Tanner Acuña is a 28 year old male   Therapist: None  PCP: Durga Pinon  Other Providers: None    Pertinent Background:  See previous notes.  Psych critical item history includes dissociation and anxiety.     Interim History                                                                                                        4, 4     The patient is a fair historian.  Since the last visit he attempted to go up to lamictal 100 mg buyt coincided with the time when he was interested in this older friend and 'over thought abouyt the potentioal for them to connect and date\". That made him too anxious and ruminative. The relationship didn't work out. Since it didn't work out, the anxiety went away. .    Recent Symptoms:   Anxiety:  excessive worry and social anxiety     Recent Substance Use:  N/A        Social/ Family History                                  [per patient report]                                 1ea,1ea   Trying to date but has not dated for 10 years. Still struggling with self-expectations.     Medical / Surgical History                                                                                                                  Patient Active Problem List   Diagnosis     Dysthymia     Recurrent major depressive disorder, in partial remission (H)     History of marijuana use       Past Surgical History:   Procedure Laterality Date     HEAD & NECK SURGERY      lump removed from neck as a child        Medical Review of Systems                                                                                                    2,10   Lost 8 lbs in 2 weeks. Shoulder pain, on natrexone The remainder of the review of systems is noncontributory  Allergy                                Mold  Current Medications                                                                                                   " "    Current Outpatient Prescriptions   Medication Sig Dispense Refill     Fexofenadine HCl (ALLEGRA PO) Take by mouth daily       lamoTRIgine (LAMICTAL) 25 MG tablet Take 1 tablet (25 mg) by mouth 2 times daily 60 tablet 0     multivitamin, therapeutic with minerals (MULTI-VITAMIN) TABS tablet Take 1 tablet by mouth daily       naltrexone (DEPADE;REVIA) 50 MG tablet Take 1 tablet (50 mg) by mouth daily 2-3 tabs daily 30 tablet 1     Vitals                                                                                                                       3, 3   /64 (BP Location: Right arm, Patient Position: Chair, Cuff Size: Adult Regular)  Pulse 67  Temp 98.3  F (36.8  C) (Temporal)  Ht 1.79 m (5' 10.47\")  Wt 76.3 kg (168 lb 3.2 oz)  BMI 23.81 kg/m2   Mental Status Exam                                                                                    9, 14 cog gs     Alertness: alert  and oriented  Appearance: casually groomed  Behavior/Demeanor: cooperative, pleasant and calm, with good  eye contact   Speech: normal  Language: intact  Psychomotor: normal or unremarkable  Mood: anxious  Affect: full range; was congruent to mood; was congruent to content  Thought Process/Associations: unremarkable  Thought Content:  Reports none;  Denies suicidal ideation with plan; with intent [details in Interim History]  Perception:  Reports derealization;  Denies auditory hallucinations and visual hallucinations  Insight: adequate  Judgment: good  Cognition: (6) oriented: time, person, and place  attention span: intact  concentration: intact  remote memory: intact  Gait/Station and/or Muscle Strength/Tone: unremarkable    Labs and Data                                                                                                                 Rating Scales:    PHQ9    PHQ9 Today:  8  PHQ-9 SCORE 1/29/2018 6/25/2018   Total Score 13 9         Diagnosis and Assessment                                                 "                             m2, h3     Today the following issues were addressed:    1) dissociation and anxiety  2) insomnia    [place] Prescription Monitoring [place] review was not needed today.    PSYCHOTROPIC DRUG INTERACTIONS: none clinically relevant    Plan                                                                                                                    m2, h3      1) dissociation  Medications-  Agrees to try and increase the lamictal again as I don't believe the excessive ruminaton about the girl was due to it.   Sertraline 50 mg after reaching lamictal 150 mg  Continue with Mindfulness ap. (seems to helpl). Hasn't seen a therapist.     2) insomnia  Medications-  Start trazadone 50 mg, may increasae to 100 mg    RTC: 2 months      CRISIS NUMBERS:   Provided routinely in AVS.    Treatment Risk Statement:  The patient understands the risks, benefits, adverse effects and alternatives. Agrees to treatment with the capacity to do so. No medical contraindications to treatment. Agrees to call clinic for any problems. The patient understands to call 911 or go to the nearest ED if life threatening or urgent symptoms occur.        Plan: RTC 2 months      PROVIDER:  Lovely Mendes MD

## 2018-07-30 NOTE — MR AVS SNAPSHOT
After Visit Summary   7/30/2018    Tanner Acuña    MRN: [id number]           Patient Information     Date Of Birth          1990        Visit Information        Provider Department      7/30/2018 9:00 AM Lovely Mendes MD [facility]        Today's Diagnoses     ISRA (generalized anxiety disorder)    -  1    Primary insomnia          Care Instructions    If tolerate lamictal at 100 mg , increase within 3 weeks to 150 mg. Maximum would be 200 mg.           Follow-ups after your visit        Follow-up notes from your care team     Return in about 2 months (around 9/30/2018).      Your next 10 appointments already scheduled     Oct 01, 2018  9:00 AM CDT   Return Visit with Lovely Mendes MD   [facility] ([facility])    [address] [address] [phone]              Who to contact     Please call your clinic at [phone] to:    Ask questions about your health    Make or cancel appointments    Discuss your medicines    Learn about your test results    Speak to your doctor            Additional Information About Your Visit        [employer] Information     [employer] gives you secure access to your electronic health record. If you see a primary care provider, you can also send messages to your care team and make appointments. If you have questions, please call your primary care clinic.  If you do not have a primary care provider, please call [phone] and they will assist you.      [employer] is an electronic gateway that provides easy, online access to your medical records. With [employer], you can request a clinic appointment, read your test results, renew a prescription or communicate with your care team.     To access your existing account, please contact your [facility] or call [phone] for assistance.        Care EveryWhere ID     This is your Care EveryWhere ID. This could be used by other organizations to access  "your [place] medical records  [id number]        Your Vitals Were     Pulse Temperature Height BMI (Body Mass Index)          67 98.3  F (36.8  C) (Temporal) 5' 10.47\" (179 cm) 23.81 kg/m2         Blood Pressure from Last 3 Encounters:   07/30/18 111/64   06/25/18 109/61   03/05/18 124/66    Weight from Last 3 Encounters:   07/30/18 168 lb 3.2 oz (76.3 kg)   06/25/18 172 lb (78 kg)   03/05/18 176 lb 6.4 oz (80 kg)              Today, you had the following     No orders found for display         Today's Medication Changes          These changes are accurate as of 7/30/18  9:47 AM.  If you have any questions, ask your nurse or doctor.               Start taking these medicines.        Dose/Directions    sertraline 100 MG tablet   Commonly known as:  ZOLOFT   Used for:  ISRA (generalized anxiety disorder)   Started by:  Lovely Mendes MD        Dose:  50 mg   Take 0.5 tablets (50 mg) by mouth daily   Quantity:  30 tablet   Refills:  1       traZODone 50 MG tablet   Commonly known as:  DESYREL   Used for:  Primary insomnia   Started by:  Lovely Mendes MD        Dose:  50 mg   Take 1 tablet (50 mg) by mouth nightly as needed for sleep   Quantity:  90 tablet   Refills:  3         These medicines have changed or have updated prescriptions.        Dose/Directions    lamoTRIgine 100 MG tablet   Commonly known as:  LAMICTAL   This may have changed:    - medication strength  - how much to take  - when to take this   Used for:  ISRA (generalized anxiety disorder)   Changed by:  Lovely Mendes MD        Dose:  100 mg   Take 1 tablet (100 mg) by mouth daily   Quantity:  60 tablet   Refills:  1            Where to get your medicines      These medications were sent to [id number] - [place], [place] - [address] [place] [address]     Phone:  [phone]     lamoTRIgine 100 MG tablet    sertraline 100 MG tablet    traZODone 50 MG tablet                Primary Care Provider Office " Phone # Fax #    Durga Pinon [phone]       [place] [address]        Equal Access to Services     ARMANDO GAVIRIA : Hadii aad ku hadchintanjohn Moeller, walesleeda luqadaha, qaybta kaalmada yaratyesha, francis owenin hayaaasiya livingstonsanford shirley yakelin wolf. So [facility] [phone].    ATENCIÓN: Si habla español, tiene a chowdhury disposición servicios gratuitos de asistencia lingüística. Llame al [phone].    We comply with applicable federal civil rights laws and [place] laws. We do not discriminate on the basis of race, color, national origin, age, disability, sex, sexual orientation, or gender identity.            Thank you!     Thank you for choosing [facility]  for your care. Our goal is always to provide you with excellent care. Hearing back from our patients is one way we can continue to improve our services. Please take a few minutes to complete the written survey that you may receive in the mail after your visit with us. Thank you!             Your Updated Medication List - Protect others around you: Learn how to safely use, store and throw away your medicines at www.disposemymeds.org.          This list is accurate as of 7/30/18  9:47 AM.  Always use your most recent med list.                   Brand Name Dispense Instructions for use Diagnosis    ALLEGRA PO      Take by mouth daily        lamoTRIgine 100 MG tablet    LAMICTAL    60 tablet    Take 1 tablet (100 mg) by mouth daily    ISRA (generalized anxiety disorder)       Multi-vitamin Tabs tablet      Take 1 tablet by mouth daily        naltrexone 50 MG tablet    DEPADE;REVIA    30 tablet    Take 1 tablet (50 mg) by mouth daily 2-3 tabs daily    Mood disorder (H)       sertraline 100 MG tablet    ZOLOFT    30 tablet    Take 0.5 tablets (50 mg) by mouth daily    ISRA (generalized anxiety disorder)       traZODone 50 MG tablet    DESYREL    90 tablet    Take 1 tablet (50 mg) by mouth nightly as needed for sleep    Primary  insomnia

## 2018-08-04 ASSESSMENT — PATIENT HEALTH QUESTIONNAIRE - PHQ9: SUM OF ALL RESPONSES TO PHQ QUESTIONS 1-9: 12

## 2018-10-17 DIAGNOSIS — F41.1 GAD (GENERALIZED ANXIETY DISORDER): ICD-10-CM

## 2018-10-17 NOTE — TELEPHONE ENCOUNTER
Last seen: 7/30/18  RTC: 2 months  Cancel: 10/1/18  No-show: none  Next appt: 10/22/18    Incoming refill from [facility]  via fax    Medication requested: Lamotrigine 100 mg   Directions: Take 2 tablets by mouth daily   Qty: 60  Last refilled: 7/30/18 with one refill     Medication refill approved per refill protocol

## 2018-10-18 RX ORDER — LAMOTRIGINE 100 MG/1
200 TABLET ORAL DAILY
Qty: 60 TABLET | Refills: 0 | Status: SHIPPED | OUTPATIENT
Start: 2018-10-18 | End: 2018-11-21

## 2018-10-22 ENCOUNTER — OFFICE VISIT (OUTPATIENT)
Dept: PSYCHIATRY | Facility: CLINIC | Age: 28
End: 2018-10-22
Payer: COMMERCIAL

## 2018-10-22 VITALS
RESPIRATION RATE: 16 BRPM | DIASTOLIC BLOOD PRESSURE: 76 MMHG | HEART RATE: 80 BPM | TEMPERATURE: 98.3 F | WEIGHT: 166.6 LBS | SYSTOLIC BLOOD PRESSURE: 137 MMHG | BODY MASS INDEX: 23.59 KG/M2

## 2018-10-22 DIAGNOSIS — F90.9 ATTENTION DEFICIT HYPERACTIVITY DISORDER (ADHD), UNSPECIFIED ADHD TYPE: Primary | ICD-10-CM

## 2018-10-22 RX ORDER — MODAFINIL 100 MG/1
100 TABLET ORAL DAILY
Qty: 30 TABLET | Refills: 0 | Status: SHIPPED | OUTPATIENT
Start: 2018-10-22 | End: 2018-11-21

## 2018-10-22 ASSESSMENT — PAIN SCALES - GENERAL: PAINLEVEL: NO PAIN (0)

## 2018-10-22 NOTE — MR AVS SNAPSHOT
After Visit Summary   10/22/2018    Tanner Acuña    MRN: [id number]           Patient Information     Date Of Birth          1990        Visit Information        Provider Department      10/22/2018 10:00 AM Lovely Mendes MD [facility]        Today's Diagnoses     Attention deficit hyperactivity disorder (ADHD), unspecified ADHD type    -  1      Care Instructions    Call if need to be seen earlier  Call in couple of weeks to update on symptoms with new medication          Follow-ups after your visit        Follow-up notes from your care team     Return in about 3 months (around 1/22/2019).      Your next 10 appointments already scheduled     Jan 28, 2019  9:00 AM CST   Return Visit with Lovely Mendes MD   [facility] ([facility])    [address] [id number] [phone]              Who to contact     Please call your clinic at [phone] to:    Ask questions about your health    Make or cancel appointments    Discuss your medicines    Learn about your test results    Speak to your doctor            Additional Information About Your Visit        [employer] Information     [employer] gives you secure access to your electronic health record. If you see a primary care provider, you can also send messages to your care team and make appointments. If you have questions, please call your primary care clinic.  If you do not have a primary care provider, please call [phone] and they will assist you.      [employer] is an electronic gateway that provides easy, online access to your medical records. With [employer], you can request a clinic appointment, read your test results, renew a prescription or communicate with your care team.     To access your existing account, please contact your [facility] or call [phone] for assistance.        Care EveryWhere ID     This is your Care EveryWhere ID. This could be used by other  organizations to access your [place] medical records  [id number]        Your Vitals Were     Pulse Temperature Respirations BMI (Body Mass Index)          80 98.3  F (36.8  C) 16 23.59 kg/m2         Blood Pressure from Last 3 Encounters:   10/22/18 137/76   07/30/18 111/64   06/25/18 109/61    Weight from Last 3 Encounters:   10/22/18 166 lb 9.6 oz (75.6 kg)   07/30/18 168 lb 3.2 oz (76.3 kg)   06/25/18 172 lb (78 kg)              Today, you had the following     No orders found for display         Today's Medication Changes          These changes are accurate as of 10/22/18  4:21 PM.  If you have any questions, ask your nurse or doctor.               Start taking these medicines.        Dose/Directions    modafinil 100 MG tablet   Commonly known as:  PROVIGIL   Used for:  Attention deficit hyperactivity disorder (ADHD), unspecified ADHD type   Started by:  Lovely Mendes MD        Dose:  100 mg   Take 1 tablet (100 mg) by mouth daily   Quantity:  30 tablet   Refills:  0            Where to get your medicines      Some of these will need a paper prescription and others can be bought over the counter.  Ask your nurse if you have questions.     Bring a paper prescription for each of these medications     modafinil 100 MG tablet                Primary Care Provider Office Phone # Fax #    Durga Pinon [phone]       [place] [id number]        Equal Access to Services     [facility] Hospital[facility]ILLIAN AH: Hadii damien rojaso Soargentina, waaxda luqadaha, qaybta kaalmada dorys, waxclaudia dominick wolf. So [facility] [phone].    ATENCIÓN: Si habla español, tiene a chowdhury disposición servicios gratuitos de asistencia lingüística. Llame al [phone].    We comply with applicable federal civil rights laws and [place] laws. We do not discriminate on the basis of race, color, national origin, age, disability, sex, sexual orientation, or gender identity.             Thank you!     Thank you for choosing [facility]  for your care. Our goal is always to provide you with excellent care. Hearing back from our patients is one way we can continue to improve our services. Please take a few minutes to complete the written survey that you may receive in the mail after your visit with us. Thank you!             Your Updated Medication List - Protect others around you: Learn how to safely use, store and throw away your medicines at www.disposemymeds.org.          This list is accurate as of 10/22/18  4:21 PM.  Always use your most recent med list.                   Brand Name Dispense Instructions for use Diagnosis    ALLEGRA PO      Take by mouth daily        lamoTRIgine 100 MG tablet    LAMICTAL    60 tablet    Take 2 tablets (200 mg) by mouth daily    ISRA (generalized anxiety disorder)       modafinil 100 MG tablet    PROVIGIL    30 tablet    Take 1 tablet (100 mg) by mouth daily    Attention deficit hyperactivity disorder (ADHD), unspecified ADHD type       Multi-vitamin Tabs tablet      Take 1 tablet by mouth daily        naltrexone 50 MG tablet    DEPADE;REVIA    30 tablet    Take 1 tablet (50 mg) by mouth daily 2-3 tabs daily    Mood disorder (H)       sertraline 100 MG tablet    ZOLOFT    30 tablet    Take 0.5 tablets (50 mg) by mouth daily    ISRA (generalized anxiety disorder)       traZODone 50 MG tablet    DESYREL    90 tablet    Take 1 tablet (50 mg) by mouth nightly as needed for sleep    Primary insomnia

## 2018-10-22 NOTE — PATIENT INSTRUCTIONS
Call if need to be seen earlier  Call in couple of weeks to update on symptoms with new medication

## 2018-10-22 NOTE — PROGRESS NOTES
"  Psychiatry Clinic Progress Note                                                                   Tanner Acuña is a 28 year old male w  PCP: Durga Pinon  Other Providers: None    Pertinent Background:  See previous notes.  Psych critical item history includes depression, anxiety and dissociations.     Interim History                                                                                                        4, 4     The patient is a good historian.  Since the last visit he reports that his depression and anxiety are drastically improved. His mood is more stable with lamotrigine. However his 'dissociation\", being here but not being here is constant unless engaged in a major / important discussion on one to one. His sleep is improved and has not been using trazadone (too sedating in the morning).    Recent Symptoms:   Depression:  depressed mood, poor concentration /memory and dissociation. Unclear if his memory and word finding problems are related to psychiatric symptoms versus lamotrigine since they existed before starting medication / yet symptoms overall are much improved.   Anxiety:  much improved  Trauma Related:  none     Recent Substance Use:  none reported        Social/ Family History                                  [per patient report]                                 1ea,1ea   Continues to be engaged with work.   Is not dating but is socializing more.     Medical / Surgical History                                                                                                                  Patient Active Problem List   Diagnosis     Dysthymia     Recurrent major depressive disorder, in partial remission (H)     History of marijuana use       Past Surgical History:   Procedure Laterality Date     HEAD & NECK SURGERY      lump removed from neck as a child        Medical Review of Systems                                                                                                    " 2,10   The remainder of the review of systems is noncontributory  Allergy                                Mold  Current Medications                                                                                                       Current Outpatient Prescriptions   Medication Sig Dispense Refill     Fexofenadine HCl (ALLEGRA PO) Take by mouth daily       lamoTRIgine (LAMICTAL) 100 MG tablet Take 2 tablets (200 mg) by mouth daily 60 tablet 0     multivitamin, therapeutic with minerals (MULTI-VITAMIN) TABS tablet Take 1 tablet by mouth daily       naltrexone (DEPADE;REVIA) 50 MG tablet Take 1 tablet (50 mg) by mouth daily 2-3 tabs daily 30 tablet 1     sertraline (ZOLOFT) 100 MG tablet Take 0.5 tablets (50 mg) by mouth daily 30 tablet 1     traZODone (DESYREL) 50 MG tablet Take 1 tablet (50 mg) by mouth nightly as needed for sleep 90 tablet 3     Vitals                                                                                                                       3, 3   /76 (BP Location: Right arm, Patient Position: Chair, Cuff Size: Adult Regular)  Pulse 80  Temp 98.3  F (36.8  C)  Resp 16  Wt 75.6 kg (166 lb 9.6 oz)  BMI 23.59 kg/m2   Mental Status Exam                                                                                    9, 14 cog gs     Alertness: alert   Appearance: well groomed  Behavior/Demeanor: cooperative, with good  eye contact   Speech: normal  Language: no problems  Psychomotor: normal or unremarkable  Mood: description consistent with euthymia  Affect: full range; was congruent to mood; was congruent to content  Thought Process/Associations: unremarkable  Thought Content:  Reports none;  Denies suicidal ideation and violent ideation  Perception:  Reports none;  Denies auditory hallucinations and visual hallucinations  Insight: good  Judgment: good  Cognition: (6) oriented: time, person, and place  attention span: intact  concentration: intact  recent memory: intact  fund  of knowledge: appropriate  Gait/Station and/or Muscle Strength/Tone: unremarkable    Labs and Data                                                                                                                 Rating Scales:    N/A    PHQ9 Today:  With nurse  PHQ-9 SCORE 1/29/2018 6/25/2018 8/3/2018   Total Score 13 9 12         Diagnosis and Assessment                                                                             m2, h3     Today the following issues were addressed:    1) dissociation remains an issue    [place] Pres[place] Monitoring [place] [] review was not needed today.    PSYCHOTROPIC DRUG INTERACTIONS: none clinically relevant    Plan                                                                                                                    m2, h3      1) Start modafanil 100 mg to see if dissociation might improve. May substitute wioth ritalin 5 mg /day if insurancedoesn't cover it  Not much better with lamotrigine now for the past 6 weeks.  Encourage exercise and meditation    RTC: 3 months but call if needed and give update on response to modafanil    CRISIS NUMBERS:   Provided routinely in AVS.    Treatment Risk Statement:  The patient understands the risks, benefits, adverse effects and alternatives. Agrees to treatment with the capacity to do so. No medical contraindications to treatment. Agrees to call clinic for any problems. The patient understands to call 911 or go to the nearest ED if life threatening or urgent symptoms occur.        PROVIDER:  Lovely Mendes MD

## 2018-10-24 ASSESSMENT — PATIENT HEALTH QUESTIONNAIRE - PHQ9: SUM OF ALL RESPONSES TO PHQ QUESTIONS 1-9: 13

## 2018-11-21 ENCOUNTER — TELEPHONE (OUTPATIENT)
Dept: PSYCHIATRY | Facility: CLINIC | Age: 28
End: 2018-11-21

## 2018-11-21 DIAGNOSIS — F90.9 ATTENTION DEFICIT HYPERACTIVITY DISORDER (ADHD), UNSPECIFIED ADHD TYPE: ICD-10-CM

## 2018-11-21 DIAGNOSIS — F41.1 GAD (GENERALIZED ANXIETY DISORDER): ICD-10-CM

## 2018-11-21 RX ORDER — LAMOTRIGINE 100 MG/1
200 TABLET ORAL DAILY
Qty: 180 TABLET | Refills: 0 | Status: SHIPPED | OUTPATIENT
Start: 2018-11-21 | End: 2019-02-28

## 2018-11-21 RX ORDER — MODAFINIL 100 MG/1
100 TABLET ORAL DAILY
Qty: 30 TABLET | Refills: 0 | Status: SHIPPED | OUTPATIENT
Start: 2018-11-21

## 2018-11-21 RX ORDER — MODAFINIL 100 MG/1
100 TABLET ORAL DAILY
Qty: 30 TABLET | Refills: 0 | Status: SHIPPED | OUTPATIENT
Start: 2018-11-21 | End: 2018-11-21

## 2018-11-21 NOTE — ADDENDUM NOTE
Addended by: MARI DIALLO on: 11/21/2018 04:07 PM     Modules accepted: Orders     Intake triaged with Dr. Urrutia who recommended IP for SI and [facility] at [facility]

## 2018-11-21 NOTE — TELEPHONE ENCOUNTER
Nilam Chaparro MA Swanson, Melanie A, RN                   RE: lamoTRIgine (LAMICTAL) 100 MG tablet     SIG: Take 2 tablets (200 mg) by mouth daily - Oral     # of days supply: 90     Pharmacy:[id number] - [address] date: 01/28/2019     Request from: Patient calling    Notes: Please refill by tomorrow per pt. Please call when completed [phone] per pt.

## 2018-11-21 NOTE — TELEPHONE ENCOUNTER
-Writer also checked in with patient on modafinil.  Per patient he forgot to call but pharmacy never got script.  Writer will reprint script and send to pharmacy.

## 2019-01-28 ENCOUNTER — OFFICE VISIT (OUTPATIENT)
Dept: PSYCHIATRY | Facility: CLINIC | Age: 29
End: 2019-01-28
Payer: COMMERCIAL

## 2019-01-28 VITALS — RESPIRATION RATE: 16 BRPM | WEIGHT: 171.2 LBS | BODY MASS INDEX: 24.24 KG/M2

## 2019-01-28 DIAGNOSIS — F34.1 DYSTHYMIA: ICD-10-CM

## 2019-01-28 DIAGNOSIS — F90.9 ATTENTION DEFICIT HYPERACTIVITY DISORDER (ADHD), UNSPECIFIED ADHD TYPE: Primary | ICD-10-CM

## 2019-01-28 DIAGNOSIS — F41.1 GAD (GENERALIZED ANXIETY DISORDER): ICD-10-CM

## 2019-01-28 RX ORDER — SERTRALINE HYDROCHLORIDE 100 MG/1
50 TABLET, FILM COATED ORAL DAILY
Qty: 30 TABLET | Refills: 1 | Status: SHIPPED | OUTPATIENT
Start: 2019-01-28 | End: 2019-06-24

## 2019-01-28 ASSESSMENT — PAIN SCALES - GENERAL: PAINLEVEL: NO PAIN (0)

## 2019-01-28 ASSESSMENT — PATIENT HEALTH QUESTIONNAIRE - PHQ9: SUM OF ALL RESPONSES TO PHQ QUESTIONS 1-9: 8

## 2019-01-28 NOTE — PROGRESS NOTES
Psychiatry Clinic Progress Note                                                                   Tanner Acuña is a 28 year old male who prefers   Therapist: None  PCP: Durga Pinon  Other Providers: None    Pertinent Background:  See previous notes.  Psych critical item history includes mutiple psychotropic trials.     Interim History                                                                                                        4, 4     The patient is a good historian.  Since the last visit has been on meds (lamotrigine and modafanil). Reports more stable mood on lamotrigine. Has not tolerated more than 25 mg of modafanil which helps with his attention but has not helped with the 'memory and recall' nor with the sense of dissociation.    Recent Symptoms:   Depression:  depressed mood   Hoplessness around the issue of dissociation but not depressed. Anxious at time and someone obsessional.      Recent Substance Use:  none reported        Social/ Family History                                  [per patient report]                                 1ea,1ea   Love the work but not the workplace.   Office politics that 'is not my thing, it is weird'    Medical / Surgical History                                                                                                                  Patient Active Problem List   Diagnosis     Dysthymia     Recurrent major depressive disorder, in partial remission (H)     History of marijuana use       Past Surgical History:   Procedure Laterality Date     HEAD & NECK SURGERY      lump removed from neck as a child        Medical Review of Systems                                                                                                    2,10   The remainder of the review of systems is noncontributory  Allergy                                Mold  Current Medications                                                                                                        Current Outpatient Medications   Medication Sig Dispense Refill     Fexofenadine HCl (ALLEGRA PO) Take by mouth daily       lamoTRIgine (LAMICTAL) 100 MG tablet Take 2 tablets (200 mg) by mouth daily 180 tablet 0     modafinil (PROVIGIL) 100 MG tablet Take 1 tablet (100 mg) by mouth daily 30 tablet 0     multivitamin, therapeutic with minerals (MULTI-VITAMIN) TABS tablet Take 1 tablet by mouth daily       sertraline (ZOLOFT) 100 MG tablet Take 0.5 tablets (50 mg) by mouth daily 30 tablet 1     naltrexone (DEPADE;REVIA) 50 MG tablet Take 1 tablet (50 mg) by mouth daily 2-3 tabs daily (Patient not taking: Reported on 1/28/2019) 30 tablet 1     traZODone (DESYREL) 50 MG tablet Take 1 tablet (50 mg) by mouth nightly as needed for sleep (Patient not taking: Reported on 1/28/2019) 90 tablet 3     Vitals                                                                                                                       3, 3   Resp 16   Wt 171 lb 3.2 oz (77.7 kg)   BMI 24.24 kg/m     Mental Status Exam                                                                                    9, 14 cog gs     Alertness: alert   Appearance: casually groomed  Behavior/Demeanor: cooperative, pleasant and calm, with good  eye contact   Speech: normal  Language: intact  Psychomotor: normal or unremarkable  Mood: description consistent with euthymia  Affect: full range; was congruent to mood; was congruent to content  Thought Process/Associations: unremarkable  Thought Content:  Reports none;  Denies suicidal ideation and violent ideation  Perception:  Reports depersonalization and derealization;  Denies auditory hallucinations and visual hallucinations  Insight: good  Judgment: good  Cognition: (6) oriented: time, person, and place  attention span: intact  concentration: intact  recent memory: intact  fund of knowledge: appropriate  Gait/Station and/or Muscle Strength/Tone: unremarkable    Labs and Data                                                                                                                  Rating Scales:    PHQ9    PHQ9 Today:  8  PHQ-9 SCORE 8/3/2018 10/22/2018 1/28/2019   PHQ-9 Total Score 12 13 8         Diagnosis and Assessment                                                                             m2, h3     Today the following issues were addressed:    1) mood stable  2) anxiety and dissociation    [place] review was not needed today.    PSYCHOTROPIC DRUG INTERACTIONS: none clinically relevant    Plan                                                                                                                    m2, h3      1) Willing to try sertraline 50 mg per day since it had helped him in the past.   Medications-   Seeking therapy  Has contacts that he will follow    RTC: 3 months    CRISIS NUMBERS:   Provided routinely in AVS.    Treatment Risk Statement:  The patient understands the risks, benefits, adverse effects and alternatives. Agrees to treatment with the capacity to do so. No medical contraindications to treatment. Agrees to call clinic for any problems. The patient understands to call 911 or go to the nearest ED if life threatening or urgent symptoms occur.    Joy Mendes MD

## 2019-02-28 DIAGNOSIS — F41.1 GAD (GENERALIZED ANXIETY DISORDER): ICD-10-CM

## 2019-03-03 RX ORDER — LAMOTRIGINE 100 MG/1
200 TABLET ORAL DAILY
Qty: 60 TABLET | Refills: 2 | Status: SHIPPED | OUTPATIENT
Start: 2019-03-03 | End: 2019-06-24

## 2019-03-03 NOTE — TELEPHONE ENCOUNTER
Medication requested: Lamictal 100 MG tab  Last refilled: 11/21/18  Qty: 180      Last seen: 1/28/19  RTC: 3 months  Cancel: 0  No-show: 0  Next appt: 4/29/19    Refill decision:   Refilled for 30 days per protocol.

## 2019-06-24 DIAGNOSIS — F41.1 GAD (GENERALIZED ANXIETY DISORDER): ICD-10-CM

## 2019-06-27 RX ORDER — SERTRALINE HYDROCHLORIDE 100 MG/1
50 TABLET, FILM COATED ORAL DAILY
Qty: 30 TABLET | Refills: 0 | Status: SHIPPED | OUTPATIENT
Start: 2019-06-27 | End: 2019-09-09

## 2019-06-27 RX ORDER — LAMOTRIGINE 100 MG/1
200 TABLET ORAL DAILY
Qty: 60 TABLET | Refills: 0 | Status: SHIPPED | OUTPATIENT
Start: 2019-06-27 | End: 2019-07-24

## 2019-06-27 NOTE — TELEPHONE ENCOUNTER
Last seen: 1/28/19  RTC: 3 months   Cancel: 4/29/19  No-show: none  Next appt: none    Incoming refill from Pharmacy via fax    Medication requested: Sertraline 100 mg   Directions: Take 50 mg by mouth daily   Qty: 30  Last refilled: 1/28/19 with one refill    Medication requested: Lamotrigine 100 mg   Directions: Take 2 tablets by mouth daily   Qty: 60  Last refilled: 3/3/19 with two refills       Medication refill approved per refill protocol- message also sent to scheduling to reach out for an appointment

## 2019-07-01 DIAGNOSIS — F41.1 GAD (GENERALIZED ANXIETY DISORDER): ICD-10-CM

## 2019-07-02 RX ORDER — SERTRALINE HYDROCHLORIDE 100 MG/1
50 TABLET, FILM COATED ORAL DAILY
Qty: 30 TABLET | Refills: 0 | OUTPATIENT
Start: 2019-07-02

## 2019-07-02 RX ORDER — LAMOTRIGINE 100 MG/1
200 TABLET ORAL DAILY
Qty: 60 TABLET | Refills: 0 | OUTPATIENT
Start: 2019-07-02

## 2019-07-24 DIAGNOSIS — F41.1 GAD (GENERALIZED ANXIETY DISORDER): ICD-10-CM

## 2019-07-24 RX ORDER — LAMOTRIGINE 100 MG/1
200 TABLET ORAL DAILY
Qty: 60 TABLET | Refills: 0 | Status: SHIPPED | OUTPATIENT
Start: 2019-07-24 | End: 2019-08-12

## 2019-08-12 DIAGNOSIS — F41.1 GAD (GENERALIZED ANXIETY DISORDER): ICD-10-CM

## 2019-08-12 RX ORDER — LAMOTRIGINE 100 MG/1
200 TABLET ORAL DAILY
Qty: 60 TABLET | Refills: 0 | Status: SHIPPED | OUTPATIENT
Start: 2019-08-12 | End: 2019-09-03

## 2019-08-12 NOTE — TELEPHONE ENCOUNTER
Last seen: 1/28/19  RTC: 3 months  Cancel: 4/29/19  No-show: none  Next appt: 9/9/19    Incoming refill from Pharmacy via fax    Medication requested: Lamotrigine 100 mg   Directions: Take 2 tablets by mouth daily   Qty: 60  Last refilled: 7/24/19    Medication refill approved per refill protocol

## 2019-08-27 DIAGNOSIS — F41.1 GAD (GENERALIZED ANXIETY DISORDER): ICD-10-CM

## 2019-08-30 RX ORDER — LAMOTRIGINE 100 MG/1
TABLET ORAL
Qty: 60 TABLET | Refills: 0 | OUTPATIENT
Start: 2019-08-30

## 2019-09-03 RX ORDER — LAMOTRIGINE 100 MG/1
200 TABLET ORAL DAILY
Qty: 60 TABLET | Refills: 0 | Status: SHIPPED | OUTPATIENT
Start: 2019-09-03 | End: 2020-11-17

## 2019-09-03 NOTE — TELEPHONE ENCOUNTER
Last seen: 1/28/19  RTC: 3 months  Cancel: 4/29/19  No-show: none  Next appt: 9/9/19    Incoming refill from Pharmacy via interface    Medication requested: Lamotrigine 100 mg  Directions: Take 2 tablets by mouth daily   Qty: 60  Last refilled: 8/12/19    Medication refill approved per refill protocol

## 2019-09-09 ENCOUNTER — OFFICE VISIT (OUTPATIENT)
Dept: PSYCHIATRY | Facility: CLINIC | Age: 29
End: 2019-09-09
Payer: COMMERCIAL

## 2019-09-09 VITALS
BODY MASS INDEX: 25.31 KG/M2 | SYSTOLIC BLOOD PRESSURE: 118 MMHG | HEART RATE: 71 BPM | WEIGHT: 178.8 LBS | DIASTOLIC BLOOD PRESSURE: 72 MMHG

## 2019-09-09 DIAGNOSIS — F41.1 GAD (GENERALIZED ANXIETY DISORDER): ICD-10-CM

## 2019-09-09 RX ORDER — SERTRALINE HYDROCHLORIDE 100 MG/1
100 TABLET, FILM COATED ORAL DAILY
Qty: 90 TABLET | Refills: 1 | Status: SHIPPED | OUTPATIENT
Start: 2019-09-09 | End: 2020-02-21

## 2019-09-09 ASSESSMENT — PAIN SCALES - GENERAL: PAINLEVEL: NO PAIN (0)

## 2019-09-09 ASSESSMENT — PATIENT HEALTH QUESTIONNAIRE - PHQ9: SUM OF ALL RESPONSES TO PHQ QUESTIONS 1-9: 12

## 2019-09-09 NOTE — PATIENT INSTRUCTIONS
- increase Zoloft up to 100mg daily  - please give us a call in 3-4 weeks. If not noticing any improvement, we can try something else. Otherwise, follow-up in 6 months

## 2019-09-09 NOTE — PROGRESS NOTES
Psychiatry Clinic Progress Note                                                                   Tanner Acuña is a 29 year old male who prefers   Therapist: None  PCP: Durga Pinon  Other Providers: None    Pertinent Background:  See previous notes.  Psych critical item history includes mutiple psychotropic trials.     Interim History                                                                                                        4, 4     The patient is a good historian.  Since the last visit has been on meds (lamotrigine and modafanil). Reports more stable mood on lamotrigine. Has not tolerated more than 25 mg of modafanil which helps with his attention but has not helped with the 'memory and recall' nor with the sense of dissociation.    - seeing an integrated [facility] doctor; taking several supplements. Seeing a therapist for past 4 months.  Dissociation/fogginess has not really improved. Feels like he is seeing the world through a foggy/painted glass. Has hope that the dissociation will eventually go away    - continuing to have obsessions about not being well. Not having the Confucianism aspect of the obsessions. Depression symptoms are at baseline.    Recent Symptoms:   Depression:  depressed mood   Anxiety: frequent worries about something being wrong with him, but less social anxiety     Recent Substance Use:  none reported        Social/ Family History                                  [per patient report]                                 1ea,1ea   Love the work but not the workplace.   Office politics that 'is not my thing, it is weird'    Medical / Surgical History                                                                                                                  Patient Active Problem List   Diagnosis     Dysthymia     Recurrent major depressive disorder, in partial remission (H)     History of marijuana use       Past Surgical History:   Procedure Laterality Date     HEAD & NECK  SURGERY      lump removed from neck as a child        Medical Review of Systems                                                                                                    2,10   The remainder of the review of systems is noncontributory  Allergy                                Mold  Current Medications                                                                                                       Current Outpatient Medications   Medication Sig Dispense Refill     Fexofenadine HCl (ALLEGRA PO) Take by mouth daily       lamoTRIgine (LAMICTAL) 100 MG tablet Take 2 tablets (200 mg) by mouth daily 60 tablet 0     multivitamin, therapeutic with minerals (MULTI-VITAMIN) TABS tablet Take 1 tablet by mouth daily       sertraline (ZOLOFT) 100 MG tablet Take 0.5 tablets (50 mg) by mouth daily 30 tablet 0     traZODone (DESYREL) 50 MG tablet Take 1 tablet (50 mg) by mouth nightly as needed for sleep 90 tablet 3     modafinil (PROVIGIL) 100 MG tablet Take 1 tablet (100 mg) by mouth daily (Patient not taking: Reported on 9/9/2019) 30 tablet 0     naltrexone (DEPADE;REVIA) 50 MG tablet Take 1 tablet (50 mg) by mouth daily 2-3 tabs daily (Patient not taking: Reported on 1/28/2019) 30 tablet 1     Vitals                                                                                                                       3, 3   /72 (BP Location: Right arm, Patient Position: Sitting, Cuff Size: Adult Regular)   Pulse 71   Wt 178 lb 12.8 oz (81.1 kg)   BMI 25.31 kg/m     Mental Status Exam                                                                                    9, 14 cog gs     Alertness: alert   Appearance: casually groomed  Behavior/Demeanor: cooperative, pleasant and calm, with good  eye contact   Speech: normal  Language: intact  Psychomotor: normal or unremarkable  Mood: description consistent with euthymia  Affect: full range; was congruent to mood; was congruent to content  Thought  Process/Associations: unremarkable  Thought Content:  Reports none;  Denies suicidal ideation and violent ideation  Perception:  Reports depersonalization and derealization;  Denies auditory hallucinations and visual hallucinations  Insight: good  Judgment: good  Cognition: (6) oriented: time, person, and place  attention span: intact  concentration: intact  recent memory: intact  fund of knowledge: appropriate  Gait/Station and/or Muscle Strength/Tone: unremarkable    Labs and Data                                                                                                                 Rating Scales:    PHQ9    PHQ9 Today:  12  PHQ-9 SCORE 10/22/2018 1/28/2019 9/9/2019   PHQ-9 Total Score 13 8 12         Diagnosis and Assessment                                                                             m2, h3     Today the following issues were addressed:    1) Dissociations  2) Dysthymia    Continues to experience feeling of dissociation but remaining quite functional. Overall, appearing less anxious and more insightful today. Has not noticed a difference at 50mg of Zoloft. Recommended increasing this today to see if this can be helpful.    [place] Prescription Monitoring [place] [] review was not needed today.    PSYCHOTROPIC DRUG INTERACTIONS: none clinically relevant    Plan                                                                                                                    m2, h3      1) Will try to increase Zoloft to 100mg.  Medications-   Seeking therapy  Has contacts that he will follow    RTC: 3 months    CRISIS NUMBERS:   Provided routinely in AVS.    Treatment Risk Statement:  The patient understands the risks, benefits, adverse effects and alternatives. Agrees to treatment with the capacity to do so. No medical contraindications to treatment. Agrees to call clinic for any problems. The patient understands to call 911 or go to the nearest ED if life threatening or urgent symptoms  occur.    Lovely Mendes MD      Physician Attestation   I, Lovely Mendes MD, saw this patient and agree with the findings and plan of care as documented in the note.          Lovely Mendes MD

## 2019-11-01 DIAGNOSIS — F41.1 GAD (GENERALIZED ANXIETY DISORDER): ICD-10-CM

## 2019-11-06 NOTE — TELEPHONE ENCOUNTER
Last seen: 9/9/19  RTC: 3 mo  Cancel: none  No-show: none  Next appt: 3/9/2020    Incoming refill from   [facility] #[id number] - [place], [place] - [address] [facility] [address]         via interface.     Medication requested: lamotrigine 100 mg tablet  Directions: Take 2 tablets (200 mg) by mouth daily  Qty: 60  Last refilled: 9/3/19    Call placed to patient to inquire if he has been receiving refills from another provider. His last Rx was on 9/3/19 with 30 day supply, it has now been >60 days. It appears the patient has been without medication for a month. Waiting for call back from the patient.

## 2019-11-08 ENCOUNTER — HEALTH MAINTENANCE LETTER (OUTPATIENT)
Age: 29
End: 2019-11-08

## 2019-11-08 RX ORDER — LAMOTRIGINE 100 MG/1
TABLET ORAL
Qty: 60 TABLET | Refills: 0 | Status: SHIPPED | OUTPATIENT
Start: 2019-11-08 | End: 2019-12-01

## 2019-12-01 DIAGNOSIS — F41.1 GAD (GENERALIZED ANXIETY DISORDER): ICD-10-CM

## 2019-12-02 RX ORDER — LAMOTRIGINE 100 MG/1
TABLET ORAL
Qty: 60 TABLET | Refills: 2 | Status: SHIPPED | OUTPATIENT
Start: 2019-12-02 | End: 2020-02-21

## 2019-12-02 NOTE — TELEPHONE ENCOUNTER
Last seen: 9/9/19  RTC: 6 months  Cancel: none  No-show: none  Next appt: 3/9/2020    Incoming refill from Pharmacy via [employer]    Medication requested: Lamotrigine 100 mg   Directions: Take 2 tablets by mouth daily   Qty: 60  Last refilled: 11/8/19    Medication refill approved per refill protocol

## 2020-02-18 ENCOUNTER — TELEPHONE (OUTPATIENT)
Dept: PSYCHIATRY | Facility: CLINIC | Age: 30
End: 2020-02-18

## 2020-02-18 NOTE — TELEPHONE ENCOUNTER
What is the concern that needs to be addressed by a nurse?Patient has an appt for 03/09/2020 at 3:30 pm and would like to get seen earlier due to side effect he is having from the medication he is on.Can  see patient earlier?He does not have any opening.Please advise.    May a detailed message be left on voicemail? yes    Date of last office visit: 09/19/2019    Message routed to: [facility]

## 2020-02-19 NOTE — TELEPHONE ENCOUNTER
-Writer returned call to patient.  States he is having some sexual dysfunction.  States that his started about a month ago.  Patient reports that he has not been active sexually for sometime, but reports he became sexually active not to long ago.  States that he has been unable to have an erection.      -Patient reports that his mood has been pretty good the past several months.           -Patient reports that he would like to get this situation handled as soon as possible.

## 2020-02-21 ENCOUNTER — OFFICE VISIT (OUTPATIENT)
Dept: PSYCHIATRY | Facility: CLINIC | Age: 30
End: 2020-02-21
Payer: COMMERCIAL

## 2020-02-21 VITALS
SYSTOLIC BLOOD PRESSURE: 111 MMHG | HEART RATE: 80 BPM | WEIGHT: 180.4 LBS | BODY MASS INDEX: 25.54 KG/M2 | DIASTOLIC BLOOD PRESSURE: 78 MMHG

## 2020-02-21 DIAGNOSIS — F41.1 GAD (GENERALIZED ANXIETY DISORDER): ICD-10-CM

## 2020-02-21 RX ORDER — SERTRALINE HYDROCHLORIDE 100 MG/1
100 TABLET, FILM COATED ORAL DAILY
Qty: 90 TABLET | Refills: 1 | Status: SHIPPED | OUTPATIENT
Start: 2020-02-21 | End: 2020-10-05

## 2020-02-21 RX ORDER — LAMOTRIGINE 100 MG/1
TABLET ORAL
Qty: 60 TABLET | Refills: 2 | Status: SHIPPED | OUTPATIENT
Start: 2020-02-21 | End: 2020-05-29

## 2020-02-21 ASSESSMENT — PATIENT HEALTH QUESTIONNAIRE - PHQ9: SUM OF ALL RESPONSES TO PHQ QUESTIONS 1-9: 10

## 2020-02-21 ASSESSMENT — PAIN SCALES - GENERAL: PAINLEVEL: NO PAIN (0)

## 2020-02-21 NOTE — PATIENT INSTRUCTIONS
- continue current medications  - Suspect that you're experiencing some performance anxiety. Okay to take tadalafil with current medication reigmen

## 2020-02-21 NOTE — PROGRESS NOTES
Psychiatry Clinic Progress Note                                                                   Tanner Acuña is a 29 year old male who prefers   Therapist: None  PCP: Durga Pinon  Other Providers: None    Pertinent Background:  First experienced depression in adolescence. Describes a sense of fogginess/dissociation  Psych critical item history includes mutiple psychotropic trials.     Interim History                                                                                                        4, 4     The patient is a good historian.     - started dating approximately a month ago; reports that he is experiencing erectile dysfunction. States that it is not from a lack of desire but a functional issue. Does feel that maybe this is partly anxiety based, especially after experiencing not being able to perform. States that he also views this relationship as being more significant and the stakes being higher. Notes that he was not experiencing ED prior to start of this relationship. No issues with ability to achieve orgasm. PCP prescribed tadalafil but wanted patient to ask us about this first    - reports that his overall anxiety/fogginess has been way better over the end of the year. Feels that some of the therapy he has done was very helpful. Planning to see therapist again      Recent Symptoms:   Depression:  depressed mood   Hoplessness around the issue of dissociation but not depressed. Anxious at time and someone obsessional.      Recent Substance Use:  none reported        Social/ Family History                                  [per patient report]                                 1ea,1ea   Love the work but not the workplace.   Office politics that 'is not my thing, it is weird'    Medical / Surgical History                                                                                                                  Patient Active Problem List   Diagnosis     Dysthymia     Recurrent major  depressive disorder, in partial remission (H)     History of marijuana use       Past Surgical History:   Procedure Laterality Date     HEAD & NECK SURGERY      lump removed from neck as a child        Medical Review of Systems                                                                                                    2,10   The remainder of the review of systems is noncontributory  Allergy                                Mold  Current Medications                                                                                                       Current Outpatient Medications   Medication Sig Dispense Refill     Fexofenadine HCl (ALLEGRA PO) Take by mouth daily       lamoTRIgine (LAMICTAL) 100 MG tablet TAKE 2 TABLETS(200 MG) BY MOUTH DAILY 60 tablet 2     lamoTRIgine (LAMICTAL) 100 MG tablet Take 2 tablets (200 mg) by mouth daily 60 tablet 0     modafinil (PROVIGIL) 100 MG tablet Take 1 tablet (100 mg) by mouth daily (Patient not taking: Reported on 9/9/2019) 30 tablet 0     multivitamin, therapeutic with minerals (MULTI-VITAMIN) TABS tablet Take 1 tablet by mouth daily       naltrexone (DEPADE;REVIA) 50 MG tablet Take 1 tablet (50 mg) by mouth daily 2-3 tabs daily (Patient not taking: Reported on 1/28/2019) 30 tablet 1     sertraline (ZOLOFT) 100 MG tablet Take 1 tablet (100 mg) by mouth daily 90 tablet 1     traZODone (DESYREL) 50 MG tablet Take 1 tablet (50 mg) by mouth nightly as needed for sleep 90 tablet 3     Vitals                                                                                                                       3, 3   /78   Pulse 80   Wt 81.8 kg (180 lb 6.4 oz)   BMI 25.54 kg/m     Mental Status Exam                                                                                    9, 14 cog gs     Alertness: alert   Appearance: casually groomed  Behavior/Demeanor: cooperative, pleasant and calm, with good  eye contact   Speech: normal  Language: intact  Psychomotor:  normal or unremarkable  Mood: description consistent with euthymia  Affect: full range; was congruent to mood; was congruent to content  Thought Process/Associations: unremarkable  Thought Content:  Reports none;  Denies suicidal ideation and violent ideation  Perception:  Reports depersonalization and derealization;  Denies auditory hallucinations and visual hallucinations  Insight: good  Judgment: good  Cognition: (6) oriented: time, person, and place  attention span: intact  concentration: intact  recent memory: intact  fund of knowledge: appropriate  Gait/Station and/or Muscle Strength/Tone: unremarkable    Labs and Data                                                                                                                 Rating Scales:    PHQ9    PHQ9 Today:  8  PHQ-9 SCORE 10/22/2018 1/28/2019 9/9/2019   PHQ-9 Total Score 13 8 12         Diagnosis and Assessment                                                                             m2, h3     Today the following issues were addressed:    1) mood stable  2) anxiety and dissociation  3) erectile dysfunction    ED best attributed to performance anxiety (as evidenced by no issues prior to start of most recent relationship). Unlikely to be side effect of Zoloft given timeline, and SSRIs are better known to suppress libido or to cause anorgasmia, neither of which are current issues for patient. Discussed that a trial of a PDE-5 inhibitor is reasonable, especially as it might offer him some reassurance.     [place] Prescription Monitoring Program [] review was not needed today.    PSYCHOTROPIC DRUG INTERACTIONS: none clinically relevant    Plan                                                                                                                    m2, h3      1) Medications:  - continue Lamictal 200mg daily  - continue sertraline 100mg daily  - continue Provigil 100mg daily  - continue trazodone 50mg nightly PRN sleep  - continue naltrexone  100-150mg daily    Seeking therapy    RTC: 3 months    CRISIS NUMBERS:   Provided routinely in AVS.    Treatment Risk Statement:  The patient understands the risks, benefits, adverse effects and alternatives. Agrees to treatment with the capacity to do so. No medical contraindications to treatment. Agrees to call clinic for any problems. The patient understands to call 911 or go to the nearest ED if life threatening or urgent symptoms occur.     Pt seen and discussed with my attending, Dr. Vaughn Park MD  PGY-4 Resident    Physician Attestation   I, Lovley Mendes MD, saw this patient and agree with the findings and plan of care as documented in the note.        Lovely Mendes MD     No

## 2020-02-29 DIAGNOSIS — F41.1 GAD (GENERALIZED ANXIETY DISORDER): ICD-10-CM

## 2020-03-03 RX ORDER — LAMOTRIGINE 100 MG/1
TABLET ORAL
Qty: 60 TABLET | Refills: 2 | OUTPATIENT
Start: 2020-03-03

## 2020-05-29 ENCOUNTER — TELEPHONE (OUTPATIENT)
Dept: PSYCHIATRY | Facility: CLINIC | Age: 30
End: 2020-05-29

## 2020-05-29 DIAGNOSIS — F41.1 GAD (GENERALIZED ANXIETY DISORDER): ICD-10-CM

## 2020-05-29 RX ORDER — LAMOTRIGINE 100 MG/1
TABLET ORAL
Qty: 60 TABLET | Refills: 0 | Status: SHIPPED | OUTPATIENT
Start: 2020-05-29 | End: 2020-07-02

## 2020-05-29 NOTE — TELEPHONE ENCOUNTER
Medication requested: LAMOTRIGINE 100MG TABLETS    Last refilled: 2/21/20  Qty: 60/2      Last seen: 2/21/20  RTC: 3 months  Cancel: 0  No-show: 0  Next appt: 0    Refill decision:   Refilled for 30 days per protocol.  Scheduling has been notified to contact the pt for appointment.

## 2020-06-29 DIAGNOSIS — F41.1 GAD (GENERALIZED ANXIETY DISORDER): ICD-10-CM

## 2020-07-02 RX ORDER — LAMOTRIGINE 100 MG/1
200 TABLET ORAL DAILY
Qty: 28 TABLET | Refills: 0 | Status: SHIPPED | OUTPATIENT
Start: 2020-07-02 | End: 2020-07-21

## 2020-07-02 NOTE — TELEPHONE ENCOUNTER
Medication requested: LAMOTRIGINE 100MG TABLETS    Last refilled: 5/29/20  Qty: 60       Last seen: 2/21/20  RTC: 3 months  Cancel: 0  No-show: 0  Next appt: 0     Refill decision:   Refilled for 14 days per protocol.  Scheduling has been notified to contact the pt for appointment.

## 2020-07-20 DIAGNOSIS — F41.1 GAD (GENERALIZED ANXIETY DISORDER): ICD-10-CM

## 2020-07-20 NOTE — TELEPHONE ENCOUNTER
Patient is going on a trip today at 11am and needs refills of Lamotrigine asap. He's planning on picking it up from [facility] in [place]. Please call him before sending the order to make sure he will be able to pick it up from the pharmacy.     Phone: [phone] or [phone]

## 2020-07-21 RX ORDER — LAMOTRIGINE 100 MG/1
200 TABLET ORAL DAILY
Qty: 60 TABLET | Refills: 0 | Status: SHIPPED | OUTPATIENT
Start: 2020-07-21 | End: 2020-12-22

## 2020-07-21 NOTE — TELEPHONE ENCOUNTER
lamoTRIgine (LAMICTAL) 100 MG tablet    Last Written Prescription Date:  7/2/20  Last Fill Quantity: 28,   # refills: 0   Last seen: 2/21/20  RTC: 3 months  Cancel: 0  No-show: 0  Next appt: 8/17/20     Refill decision: Refilled for 30 days per protocol.  Appt in place 8/17/20  Patient notified by phone.

## 2020-08-17 ENCOUNTER — VIRTUAL VISIT (OUTPATIENT)
Dept: PSYCHIATRY | Facility: CLINIC | Age: 30
End: 2020-08-17
Payer: COMMERCIAL

## 2020-08-17 DIAGNOSIS — F34.1 DYSTHYMIA: ICD-10-CM

## 2020-08-17 DIAGNOSIS — F33.41 RECURRENT MAJOR DEPRESSIVE DISORDER, IN PARTIAL REMISSION (H): Primary | ICD-10-CM

## 2020-08-17 RX ORDER — LAMOTRIGINE 100 MG/1
200 TABLET ORAL DAILY
Qty: 180 TABLET | Refills: 0 | Status: SHIPPED | OUTPATIENT
Start: 2020-08-17 | End: 2020-10-05

## 2020-08-17 RX ORDER — SERTRALINE HYDROCHLORIDE 100 MG/1
100 TABLET, FILM COATED ORAL DAILY
Qty: 90 TABLET | Refills: 0 | Status: SHIPPED | OUTPATIENT
Start: 2020-08-17 | End: 2020-11-15

## 2020-08-18 NOTE — PROGRESS NOTES
"VIDEO VISIT  Tanner Acuña is the patient who is being evaluated via a billable video visit.      The patient has been notified of following:   \"We have found that certain health care needs can be provided without the need for an in-person physical exam. This service lets us provide the care you need with a video conversation. If a prescription is necessary we can send it directly to your pharmacy. If lab work is needed we can place an order for that and you can then stop by our lab to have the test done at a later time. Insurers are generally covering virtual visits as they would in-office visits so billing should not be different than normal.  If for some reason you do get billed incorrectly, you should contact the billing office to correct it and that number is in the AVS .    Patient has given verbal consent for video visit?: Yes   How would you like to obtain your AVS?: PaxVax  AVS SmartPhrase [PsychAVS] has been placed in 'Patient Instructions': Yes      Video- Visit Details  Type of service:  video visit for mental health treatment.  Time of service:    Date:  8/17/2020    Video Start Time:  3:35 PM       Video End Time:  4:00 p[m    Reason for video visit: COVID-19  Originating Site (patient location):  Patient's home  Distant Site (provider location):  Psychiatry Clinic  Mode of Communication:  Video Conference via [facility]    Neuromodulation Program Progress Note                                                                   Tanner Acuña is a 30 year old male  PCP: Durga Pinon      Pertinent Background:  First experienced depression in adolescence. Describes a sense of fogginess/dissociation  Psych critical item history includes mutiple psychotropic trials    Interim History                                                                                                        4, 4     He is feeling well. He has remained adherent with lamotrigine and sertraline. He is no longer using trazodone due to " "intolerable oversedation. He still has brief mood dips but never intense or lasting 2wks or more. He feels that he has found more support online for dealing with anxious ruination and depersonalization sensations. He was afraid that his use of cannabis had \"permanently ruined his brain\" but finding techniques others are using helped him to feel more hopeful that these sensations will have less impact on him going forward. He denies concerns for intolerable side effects. He reports a long term goal to not have to take medication but no current plans to taper at this time         Recent Substance Use:  none reported        Social/ Family History                                  [per patient report]                                 1ea,1ea   Denies significant change in social support    Medical / Surgical History                                                                                                                  Patient Active Problem List   Diagnosis     Dysthymia     Recurrent major depressive disorder, in partial remission (H)     History of marijuana use       Past Surgical History:   Procedure Laterality Date     HEAD & NECK SURGERY      lump removed from neck as a child        Medical Review of Systems                                                                                                    2,10   A comprehensive review of systems was performed and is negative other than noted in the HPI.    Allergy                                Mold    Current Medications                                                                                                       Current Outpatient Medications   Medication Sig Dispense Refill     lamoTRIgine (LAMICTAL) 100 MG tablet Take 2 tablets (200 mg) by mouth daily 180 tablet 0     sertraline (ZOLOFT) 100 MG tablet Take 1 tablet (100 mg) by mouth daily 90 tablet 0     Fexofenadine HCl (ALLEGRA PO) Take by mouth daily       lamoTRIgine (LAMICTAL) 100 MG tablet " Take 2 tablets (200 mg) by mouth daily 60 tablet 0     lamoTRIgine (LAMICTAL) 100 MG tablet Take 2 tablets (200 mg) by mouth daily 60 tablet 0     modafinil (PROVIGIL) 100 MG tablet Take 1 tablet (100 mg) by mouth daily (Patient not taking: Reported on 9/9/2019) 30 tablet 0     multivitamin, therapeutic with minerals (MULTI-VITAMIN) TABS tablet Take 1 tablet by mouth daily       naltrexone (DEPADE;REVIA) 50 MG tablet Take 1 tablet (50 mg) by mouth daily 2-3 tabs daily (Patient not taking: Reported on 1/28/2019) 30 tablet 1     sertraline 100 MG PO tablet Take 1 tablet (100 mg) by mouth daily 90 tablet 1       Vitals                                                                                                                       3, 3   There were no vitals taken for this visit.     Mental Status Exam                                                                                    9, 14 cog gs     Alertness: alert  and oriented  Appearance: casually groomed  Behavior/Demeanor: cooperative, pleasant and calm, with good  eye contact   Speech: regular rate and rhythm  Language: intact  Psychomotor: normal or unremarkable  Mood: description consistent with euthymia  Affect: full range; was congruent to mood; was congruent to content  Thought Process/Associations: unremarkable  Thought Content:  Reports preoccupations;  Denies suicidal ideation  Perception:  Reports depersonalization;  Denies auditory hallucinations, visual hallucinations and none  Insight: fair  Judgment: good  Cognition: (6) does  appear grossly intact; formal cognitive testing was not done  Gait/Station and/or Muscle Strength/Tone: unremarkable    Labs and Data                                                                                                                 Rating Scales:    N/A    PHQ9 Today:  n/a  PHQ 1/28/2019 9/9/2019 2/21/2020   PHQ-9 Total Score 8 12 10   Q9: Thoughts of better off dead/self-harm past 2 weeks Not at all Not at  all Not at all         Diagnosis and Assessment                                                                             m2, h3     Today the following issues were addressed:    1) Dysthymia    [place] review was not needed today.    PSYCHOTROPIC DRUG INTERACTIONS: none clinically relevant    Plan                                                                                                                    m2, h3      1) Dysthymia, anxiety and depersonalization  -- Medication: Continue Lamotrigine 200mg and sertraline 100mg daily  -- Psychotherapy: Continue individual psychotherapy      RTC: 3mo    CRISIS NUMBERS:   Provided routinely in AVS.    Treatment Risk Statement:  The patient understands the risks, benefits, adverse effects and alternatives. Agrees to treatment with the capacity to do so. No medical contraindications to treatment. Agrees to call clinic for any problems. The patient understands to call 911 or go to the nearest ED if life threatening or urgent symptoms occur.     Mag Brito MD.   Pt seen and discussed with  Lovely Mendes MD    Physician Attestation   I, Lovely Mendes MD, saw this patient and agree with the findings and plan of care as documented in the note.        Lovely Mendes MD

## 2020-10-05 DIAGNOSIS — F34.1 DYSTHYMIA: ICD-10-CM

## 2020-10-05 DIAGNOSIS — F41.1 GAD (GENERALIZED ANXIETY DISORDER): ICD-10-CM

## 2020-10-05 RX ORDER — LAMOTRIGINE 100 MG/1
200 TABLET ORAL DAILY
Qty: 10 TABLET | Refills: 0 | Status: SHIPPED | OUTPATIENT
Start: 2020-10-05 | End: 2021-01-20

## 2020-10-05 RX ORDER — SERTRALINE HYDROCHLORIDE 100 MG/1
100 TABLET, FILM COATED ORAL DAILY
Qty: 5 TABLET | Refills: 0 | Status: SHIPPED | OUTPATIENT
Start: 2020-10-05 | End: 2020-11-17

## 2020-10-05 NOTE — TELEPHONE ENCOUNTER
-Reviewed chart, patient is still taking medications.  Will send 5 day supply to local pharmacy.      -Writer attempted to call patient to let him know refills were approved, however received voicemail.  Left message asking for a return call.  Clinic number provided.

## 2020-10-05 NOTE — TELEPHONE ENCOUNTER
Patient went to [place] for a wedding and is extending his stay.  He requires a 5 day supply sent to a local pharmacy.

## 2020-11-16 DIAGNOSIS — F41.1 GAD (GENERALIZED ANXIETY DISORDER): ICD-10-CM

## 2020-11-17 RX ORDER — LAMOTRIGINE 100 MG/1
200 TABLET ORAL DAILY
Qty: 60 TABLET | Refills: 0 | Status: SHIPPED | OUTPATIENT
Start: 2020-11-17

## 2020-11-17 RX ORDER — SERTRALINE HYDROCHLORIDE 100 MG/1
100 TABLET, FILM COATED ORAL DAILY
Qty: 30 TABLET | Refills: 0 | Status: SHIPPED | OUTPATIENT
Start: 2020-11-17 | End: 2021-01-20

## 2020-11-17 NOTE — TELEPHONE ENCOUNTER
Medication requested:  lamoTRIgine (LAMICTAL) 100 MG tablet  Last refilled: 10/5/20  Qty: 10/0( emergency refill> wedding out of town)  sertraline (ZOLOFT) 100 MG tablet  Last refilled: 10/5/20  Qty: 15/0( emergency refill> wedding out of town)     Last seen: 8/17/20 Continue Lamotrigine 200mg and sertraline 100mg daily  RTC: 3 months  Cancel: 0  No-show: 0  Next appt: 0    Refill decision:   Refilled for 30 days per protocol.

## 2020-12-06 ENCOUNTER — HEALTH MAINTENANCE LETTER (OUTPATIENT)
Age: 30
End: 2020-12-06

## 2020-12-22 DIAGNOSIS — F41.1 GAD (GENERALIZED ANXIETY DISORDER): ICD-10-CM

## 2020-12-22 RX ORDER — LAMOTRIGINE 100 MG/1
200 TABLET ORAL DAILY
Qty: 60 TABLET | Refills: 0 | Status: SHIPPED | OUTPATIENT
Start: 2020-12-22

## 2020-12-22 NOTE — TELEPHONE ENCOUNTER
Last seen: 8/17/20   RTC: 3 months  Cancel: 3/9/20 - rescheduled  No-show: 0  Next appt: none    Incoming refill from patient via telephone    Medication requested: Lamotrigine 100 MG  Directions: take 2 tablets PO daily  Qty: 60  Last refilled: 11/17/20    Medication refill approved per refill protocol

## 2020-12-22 NOTE — TELEPHONE ENCOUNTER
Patient called and said he is out of town and will run out of medication after today. Send to [facility] in [place] on file in chart

## 2021-01-20 DIAGNOSIS — F41.1 GAD (GENERALIZED ANXIETY DISORDER): ICD-10-CM

## 2021-01-20 DIAGNOSIS — F34.1 DYSTHYMIA: ICD-10-CM

## 2021-01-20 RX ORDER — SERTRALINE HYDROCHLORIDE 100 MG/1
100 TABLET, FILM COATED ORAL DAILY
Qty: 30 TABLET | Refills: 0 | Status: SHIPPED | OUTPATIENT
Start: 2021-01-20

## 2021-01-20 RX ORDER — LAMOTRIGINE 100 MG/1
200 TABLET ORAL DAILY
Qty: 10 TABLET | Refills: 0 | Status: SHIPPED | OUTPATIENT
Start: 2021-01-20 | End: 2021-01-25

## 2021-01-20 NOTE — TELEPHONE ENCOUNTER
Medication Refill request received for   Pending Prescriptions:                       Disp   Refills    lamoTRIgine (LAMICTAL) 100 MG tablet      10 tab*0            Sig: Take 2 tablets (200 mg) by mouth daily    sertraline (ZOLOFT) 100 MG tablet         30 tab*0            Sig: Take 1 tablet (100 mg) by mouth daily      Last Written Prescription Date:  12/22/20  Last Fill Quantity: 60,   # refills: 0   Length of last prescription in time: 1 month  Last Office Visit : 8/17/20  Future Office visit:  1/25/21    Last Written Prescription Date:  11/17/20  Last Fill Quantity: 30,   # refills: 0   Length of last prescription in time: 1 month  Last Office Visit : 8/17/20  Future Office visit:  1/25/21    [facility] Medication Refill Protocol requirements:    Refill request was approved per [facility] Medication Refill Protocol requirements.

## 2021-01-25 ENCOUNTER — VIRTUAL VISIT (OUTPATIENT)
Dept: PSYCHIATRY | Facility: CLINIC | Age: 31
End: 2021-01-25
Payer: COMMERCIAL

## 2021-01-25 DIAGNOSIS — F34.1 DYSTHYMIA: ICD-10-CM

## 2021-01-25 DIAGNOSIS — F33.41 RECURRENT MAJOR DEPRESSIVE DISORDER, IN PARTIAL REMISSION (H): Primary | ICD-10-CM

## 2021-01-25 RX ORDER — LAMOTRIGINE 100 MG/1
200 TABLET ORAL DAILY
Qty: 60 TABLET | Refills: 0 | Status: SHIPPED | OUTPATIENT
Start: 2021-01-25

## 2021-01-25 RX ORDER — AMOXICILLIN 500 MG
1200 CAPSULE ORAL DAILY
COMMUNITY

## 2021-01-25 ASSESSMENT — PATIENT HEALTH QUESTIONNAIRE - PHQ9: SUM OF ALL RESPONSES TO PHQ QUESTIONS 1-9: 4

## 2021-01-25 NOTE — TELEPHONE ENCOUNTER
Medication Refill request received for   Pending Prescriptions:                       Disp   Refills    lamoTRIgine (LAMICTAL) 100 MG tablet      60 tab*0            Sig: Take 2 tablets (200 mg) by mouth daily      Last Written Prescription Date:  1/20/21  Last Fill Quantity: 10,   # refills: 0   Length of last prescription in time: 1 week (error)  Last Office Visit : 8/17/20  Future Office visit:  1/25/21    [facility] Medication Refill Protocol requirements:    Refill request was approved per [facility] Medication Refill Protocol requirements.

## 2021-01-25 NOTE — PROGRESS NOTES
Tanner is a 30 year old who is being evaluated via a billable video visit.      How would you like to obtain your AVS? MyChart  If the video visit is dropped, the invitation should be resent by: Text to cell phone: [phone]      Will anyone else be joining your video visit? No      Video Start Time: 3:30 pm    Video-Visit Details    Type of service:  Video Visit    Video End Time:4 pm    Originating Location (pt. Location): Home    Distant Location (provider location):  [facility] PSYCHIATRY     Platform used for Video Visit: Marilu Mendes MD

## 2021-01-25 NOTE — TELEPHONE ENCOUNTER
What is the concern that needs to be addressed by a nurse? Pt needs refill, rx was written for 5 days only and he has 1 days worth left.     May a detailed message be left on voicemail? yes    Date of last office visit: 08/17/20, appt today     Message routed to: psych rn pool

## 2021-01-29 RX ORDER — LAMOTRIGINE 100 MG/1
100 TABLET ORAL DAILY
Qty: 60 TABLET | Refills: 0 | Status: SHIPPED | OUTPATIENT
Start: 2021-01-29

## 2021-01-29 NOTE — PROGRESS NOTES
"  [facility] Progress Note                                                                   Tanner Acuña is a 30 year old male   Therapist: None  PCP: Durga Pinon  Other Providers: None    Pertinent Background:  First experienced depression in adolescence. Describes a sense of fogginess/dissociation  Psych critical item history includes mutiple psychotropic trials.        Interim History                                                                                                        4, 4     In the interval he reports continued medication adherence. Overall he feels that he has been doing better in functioning and managing anxiety for several months. He still experiences dissociation  But not as intense or distressing or as frequent as when he initiated treatment.  He finds that having a dog and his 30 houseplants has encouraged him to keep a regular daily routine and that helps.  He feels like feelings of depression are not relevant to him at this time.  He feels 90% improved. He denies SI. He denies medication side effects.             Social/ Family History                                  [per patient report]                                 1ea,1ea   Update. Got new puppy  \"Cronin \" [place] sheep dog.  He is no longer dating after break up in Septemebr  Medical / Surgical History                                                                                                                  Patient Active Problem List   Diagnosis     Dysthymia     Recurrent major depressive disorder, in partial remission (H)     History of marijuana use       Past Surgical History:   Procedure Laterality Date     HEAD & NECK SURGERY      lump removed from neck as a child        Medical Review of Systems                                                                                                    2,10   A comprehensive review of systems was performed and is negative other than noted in the " HPI.    Allergy                                Mold    Current Medications                                                                                                       Current Outpatient Medications   Medication Sig Dispense Refill     lamoTRIgine (LAMICTAL) 100 MG tablet Take 1 tablet (100 mg) by mouth daily 60 tablet 0     lamoTRIgine (LAMICTAL) 100 MG tablet Take 2 tablets (200 mg) by mouth daily 60 tablet 0     Omega-3 Fatty Acids (FISH OIL) 1200 MG capsule Take 1,200 mg by mouth daily       sertraline (ZOLOFT) 100 MG tablet Take 1 tablet (100 mg) by mouth daily 30 tablet 0     Fexofenadine HCl (ALLEGRA PO) Take by mouth daily       lamoTRIgine (LAMICTAL) 100 MG tablet Take 2 tablets (200 mg) by mouth daily 60 tablet 0     lamoTRIgine (LAMICTAL) 100 MG tablet Take 2 tablets (200 mg) by mouth daily 60 tablet 0     modafinil (PROVIGIL) 100 MG tablet Take 1 tablet (100 mg) by mouth daily (Patient not taking: Reported on 9/9/2019) 30 tablet 0     multivitamin, therapeutic with minerals (MULTI-VITAMIN) TABS tablet Take 1 tablet by mouth daily       naltrexone (DEPADE;REVIA) 50 MG tablet Take 1 tablet (50 mg) by mouth daily 2-3 tabs daily (Patient not taking: Reported on 1/28/2019) 30 tablet 1     sertraline (ZOLOFT) 100 MG tablet Take 1 tablet (100 mg) by mouth daily 90 tablet 0       Vitals                                                                                                                       3, 3   There were no vitals taken for this visit.     Mental Status Exam                                                                                    9, 14 cog gs     Alertness: alert  and oriented  Appearance: well groomed and appearance was notable for presence of puppy on couch with him during appt  Behavior/Demeanor: cooperative and pleasant, with good  eye contact   Speech: regular rate and rhythm and soft spoken  Language: intact and no obvious problem  Psychomotor: normal or  unremarkable  Mood: description consistent with euthymia  Affect: restricted; was congruent to mood; was congruent to content  Thought Process/Associations: circumstantial  Thought Content:  Reports none;  Denies suicidal and violent ideation  Perception:  Reports depersonalization;  Denies none  Insight: good  Judgment: good  Cognition: (6) does  appear grossly intact; formal cognitive testing was not done  Gait/Station and/or Muscle Strength/Tone: unremarkable    Labs and Data                                                                                                                 Rating Scales:    PHQ9    PHQ9 Today:  4  PHQ 9/9/2019 2/21/2020 1/25/2021   PHQ-9 Total Score 12 10 4   Q9: Thoughts of better off dead/self-harm past 2 weeks Not at all Not at all Not at all         Diagnosis and Assessment                                                                             m2, h3     Today the following issues were addressed:    Anxiety and Dissociation  He presents today reporting stable management of anxiety and significant decrease in the frequency and intensity of dissociation. He feels that he is stable and would like to attempt a trial off of medication. We reviewed his treatment history and the improvements he has noted. H alejandre snot have side effects but it is reasonable to consider a trial off of medication and gave tapering instructions for lamictal and sertrlaine    [place] Prescription Monitoring Program [] review was not needed today.    PSYCHOTROPIC DRUG INTERACTIONS: none clinically relevant    Plan                                                                                                                    m2, h3      1) Anxiety and dissociation  -- Medication: Plan to taper off of medications  Decrease  Lamotrigine by 50mg Every 5 days until off  Decrease sertraline by  50mg every 5 days until off      RTC: PRN    CRISIS NUMBERS:   Provided routinely in AVS.    Treatment Risk Statement:   The patient understands the risks, benefits, adverse effects and alternatives. Agrees to treatment with the capacity to do so. No medical contraindications to treatment. Agrees to call clinic for any problems. The patient understands to call 911 or go to the nearest ED if life threatening or urgent symptoms occur.     Mag Brito MD   Pt seen and case discussed with  Lovely Mendes MD      Physician Attestation   I, Lovely Mendes MD, saw this patient and agree with the findings and plan of care as documented in the note.        Lovely Mendes MD

## 2021-02-14 DIAGNOSIS — F41.1 GAD (GENERALIZED ANXIETY DISORDER): ICD-10-CM

## 2021-02-16 RX ORDER — SERTRALINE HYDROCHLORIDE 100 MG/1
100 TABLET, FILM COATED ORAL DAILY
Qty: 30 TABLET | Refills: 0 | OUTPATIENT
Start: 2021-02-16

## 2021-02-16 NOTE — TELEPHONE ENCOUNTER
Medication requested:sertraline (ZOLOFT) 100 MG tablet   Last refilled: 1/20/2021   Qty: 30/0      Last seen: 1/25/2021: Medication: Plan to taper off of medications  Decrease  Lamotrigine by 50mg Every 5 days until off  Decrease sertraline by  50mg every 5 days until off  RTC: PRN  Cancel: 0  No-show: 0  Next appt: 0    Refill decision: Denied:   per last note 1/25/2021:Medication: Plan to taper off of medications  Decrease  Lamotrigine by 50mg Every 5 days until off  Decrease sertraline by  50mg every 5 days until off

## 2021-04-15 DIAGNOSIS — F41.1 GAD (GENERALIZED ANXIETY DISORDER): ICD-10-CM

## 2021-04-16 RX ORDER — SERTRALINE HYDROCHLORIDE 100 MG/1
100 TABLET, FILM COATED ORAL DAILY
Qty: 30 TABLET | Refills: 0 | OUTPATIENT
Start: 2021-04-16

## 2021-04-16 NOTE — TELEPHONE ENCOUNTER
Refill denied  Pharmacy called and notified pt last seen1/25/21-Decrease sertraline by 50mg every 5 days until off  Informed pharmacy to have pt call if wants to restart Zoloft..

## 2021-09-25 ENCOUNTER — HEALTH MAINTENANCE LETTER (OUTPATIENT)
Age: 31
End: 2021-09-25

## 2021-12-17 NOTE — TELEPHONE ENCOUNTER
Last seen: 1/28/19  RTC: 3 months  Cancel: 4/29/19  No-show: none  Next appt: none scheduled    Incoming refill from Patient via phone    Medication requested: Lamotrigine 100 mg    Directions: Take 2 tablets by mouth daily   Qty: 60  Last refilled: 6/27/19    Medication refill approved per refill protocol-message sent to scheduling to call patient to set up appointment       
No

## 2022-01-15 ENCOUNTER — HEALTH MAINTENANCE LETTER (OUTPATIENT)
Age: 32
End: 2022-01-15

## 2023-01-07 ENCOUNTER — HEALTH MAINTENANCE LETTER (OUTPATIENT)
Age: 33
End: 2023-01-07

## 2023-04-22 ENCOUNTER — HEALTH MAINTENANCE LETTER (OUTPATIENT)
Age: 33
End: 2023-04-22